# Patient Record
Sex: MALE | Race: WHITE | NOT HISPANIC OR LATINO
[De-identification: names, ages, dates, MRNs, and addresses within clinical notes are randomized per-mention and may not be internally consistent; named-entity substitution may affect disease eponyms.]

---

## 2017-04-25 ENCOUNTER — LABORATORY RESULT (OUTPATIENT)
Age: 47
End: 2017-04-25

## 2017-04-25 ENCOUNTER — APPOINTMENT (OUTPATIENT)
Dept: INTERNAL MEDICINE | Facility: CLINIC | Age: 47
End: 2017-04-25

## 2017-04-25 VITALS
HEIGHT: 65 IN | DIASTOLIC BLOOD PRESSURE: 86 MMHG | TEMPERATURE: 97.5 F | WEIGHT: 205 LBS | BODY MASS INDEX: 34.16 KG/M2 | SYSTOLIC BLOOD PRESSURE: 140 MMHG

## 2017-04-25 RX ORDER — ARIPIPRAZOLE 30 MG/1
30 TABLET ORAL
Refills: 0 | Status: ACTIVE | COMMUNITY

## 2017-04-27 ENCOUNTER — LABORATORY RESULT (OUTPATIENT)
Age: 47
End: 2017-04-27

## 2017-05-01 ENCOUNTER — OTHER (OUTPATIENT)
Age: 47
End: 2017-05-01

## 2017-05-01 LAB
ALBUMIN SERPL ELPH-MCNC: 4.5 G/DL
ALP BLD-CCNC: 41 U/L
ALT SERPL-CCNC: 33 U/L
ANION GAP SERPL CALC-SCNC: 14 MMOL/L
AST SERPL-CCNC: 26 U/L
BACTERIA STL CULT: NORMAL
BAKER'S YEAST AB QL: 37.2 UNITS
BAKER'S YEAST IGA QL IA: 54.1 UNITS
BAKER'S YEAST IGA QN IA: POSITIVE
BAKER'S YEAST IGG QN IA: POSITIVE
BASOPHILS # BLD AUTO: 0.01 K/UL
BASOPHILS NFR BLD AUTO: 0.2 %
BILIRUB SERPL-MCNC: 0.4 MG/DL
BUN SERPL-MCNC: 15 MG/DL
C DIFF TOX GENS STL QL NAA+PROBE: NORMAL
CALCIUM SERPL-MCNC: 9.7 MG/DL
CDIFF BY PCR: NOT DETECTED
CHLORIDE SERPL-SCNC: 101 MMOL/L
CO2 SERPL-SCNC: 28 MMOL/L
CREAT SERPL-MCNC: 1.08 MG/DL
CRYPTOSP AG SPEC QL: NORMAL
DEPRECATED O AND P PREP STL: NORMAL
ENDOMYSIUM IGA SER QL: NORMAL
ENDOMYSIUM IGA TITR SER: NORMAL
EOSINOPHIL # BLD AUTO: 0.1 K/UL
EOSINOPHIL NFR BLD AUTO: 2 %
G LAMBLIA AG STL QL: NORMAL
GLIADIN IGA SER QL: 6 UNITS
GLIADIN IGG SER QL: <5 UNITS
GLIADIN PEPTIDE IGA SER-ACNC: NEGATIVE
GLIADIN PEPTIDE IGG SER-ACNC: NEGATIVE
GLUCOSE SERPL-MCNC: 93 MG/DL
HCT VFR BLD CALC: 44.8 %
HGB BLD-MCNC: 15.1 G/DL
IGA SER QL IEP: 334 MG/DL
IMM GRANULOCYTES NFR BLD AUTO: 0.2 %
LYMPHOCYTES # BLD AUTO: 1.65 K/UL
LYMPHOCYTES NFR BLD AUTO: 32.3 %
MAN DIFF?: NORMAL
MCHC RBC-ENTMCNC: 27.4 PG
MCHC RBC-ENTMCNC: 33.7 GM/DL
MCV RBC AUTO: 81.3 FL
MONOCYTES # BLD AUTO: 0.46 K/UL
MONOCYTES NFR BLD AUTO: 9 %
MPO AB + PR3 PNL SER: NORMAL
NEUTROPHILS # BLD AUTO: 2.88 K/UL
NEUTROPHILS NFR BLD AUTO: 56.3 %
PLATELET # BLD AUTO: 208 K/UL
POTASSIUM SERPL-SCNC: 4.3 MMOL/L
PROT SERPL-MCNC: 8 G/DL
RBC # BLD: 5.51 M/UL
RBC # FLD: 13.8 %
SODIUM SERPL-SCNC: 143 MMOL/L
TTG IGA SER IA-ACNC: 6.5 UNITS
TTG IGA SER-ACNC: NEGATIVE
TTG IGG SER IA-ACNC: <5 UNITS
TTG IGG SER IA-ACNC: NEGATIVE
WBC # FLD AUTO: 5.11 K/UL

## 2017-05-03 LAB
ANNOTATION COMMENT IMP: NORMAL
HLA-DQ2: NEGATIVE
HLA-DQ8 QL: NEGATIVE
REF LAB TEST METHOD: NORMAL

## 2017-06-12 ENCOUNTER — LABORATORY RESULT (OUTPATIENT)
Age: 47
End: 2017-06-12

## 2017-06-12 ENCOUNTER — APPOINTMENT (OUTPATIENT)
Dept: INTERNAL MEDICINE | Facility: CLINIC | Age: 47
End: 2017-06-12

## 2017-07-18 ENCOUNTER — APPOINTMENT (OUTPATIENT)
Dept: INTERNAL MEDICINE | Facility: CLINIC | Age: 47
End: 2017-07-18

## 2017-07-18 VITALS
BODY MASS INDEX: 32.32 KG/M2 | SYSTOLIC BLOOD PRESSURE: 132 MMHG | TEMPERATURE: 96.3 F | DIASTOLIC BLOOD PRESSURE: 80 MMHG | HEIGHT: 65 IN | WEIGHT: 194 LBS

## 2017-07-18 DIAGNOSIS — R19.7 DIARRHEA, UNSPECIFIED: ICD-10-CM

## 2018-05-11 ENCOUNTER — TRANSCRIPTION ENCOUNTER (OUTPATIENT)
Age: 48
End: 2018-05-11

## 2018-05-18 ENCOUNTER — APPOINTMENT (OUTPATIENT)
Dept: PULMONOLOGY | Facility: CLINIC | Age: 48
End: 2018-05-18
Payer: COMMERCIAL

## 2018-05-18 VITALS
OXYGEN SATURATION: 94 % | DIASTOLIC BLOOD PRESSURE: 80 MMHG | WEIGHT: 198 LBS | HEIGHT: 65 IN | HEART RATE: 96 BPM | BODY MASS INDEX: 32.99 KG/M2 | SYSTOLIC BLOOD PRESSURE: 130 MMHG

## 2018-05-18 PROCEDURE — 94727 GAS DIL/WSHOT DETER LNG VOL: CPT

## 2018-05-18 PROCEDURE — 94060 EVALUATION OF WHEEZING: CPT

## 2018-05-18 PROCEDURE — 99203 OFFICE O/P NEW LOW 30 MIN: CPT | Mod: 25

## 2018-05-18 PROCEDURE — 94729 DIFFUSING CAPACITY: CPT

## 2018-06-13 ENCOUNTER — APPOINTMENT (OUTPATIENT)
Dept: PULMONOLOGY | Facility: CLINIC | Age: 48
End: 2018-06-13
Payer: COMMERCIAL

## 2018-06-13 VITALS
TEMPERATURE: 98.8 F | WEIGHT: 200 LBS | BODY MASS INDEX: 33.28 KG/M2 | SYSTOLIC BLOOD PRESSURE: 120 MMHG | DIASTOLIC BLOOD PRESSURE: 78 MMHG | OXYGEN SATURATION: 94 % | HEART RATE: 104 BPM

## 2018-06-13 PROCEDURE — 99214 OFFICE O/P EST MOD 30 MIN: CPT

## 2018-07-10 ENCOUNTER — APPOINTMENT (OUTPATIENT)
Dept: PULMONOLOGY | Facility: CLINIC | Age: 48
End: 2018-07-10

## 2018-08-07 ENCOUNTER — APPOINTMENT (OUTPATIENT)
Dept: INTERNAL MEDICINE | Facility: CLINIC | Age: 48
End: 2018-08-07
Payer: COMMERCIAL

## 2018-08-07 VITALS
TEMPERATURE: 97.04 F | DIASTOLIC BLOOD PRESSURE: 80 MMHG | BODY MASS INDEX: 33.99 KG/M2 | SYSTOLIC BLOOD PRESSURE: 140 MMHG | HEIGHT: 65 IN | WEIGHT: 204 LBS

## 2018-08-07 PROCEDURE — 99214 OFFICE O/P EST MOD 30 MIN: CPT

## 2018-08-07 RX ORDER — DOXYCYCLINE HYCLATE 100 MG/1
100 CAPSULE ORAL
Qty: 10 | Refills: 0 | Status: DISCONTINUED | COMMUNITY
Start: 2018-06-13 | End: 2018-08-07

## 2018-08-07 RX ORDER — PREDNISONE 10 MG/1
10 TABLET ORAL
Qty: 20 | Refills: 0 | Status: DISCONTINUED | COMMUNITY
Start: 2018-06-13 | End: 2018-08-07

## 2018-08-07 RX ORDER — GUAIFENESIN 600 MG/1
600 TABLET, EXTENDED RELEASE ORAL TWICE DAILY
Qty: 60 | Refills: 2 | Status: DISCONTINUED | COMMUNITY
Start: 2018-06-13 | End: 2018-08-07

## 2018-08-07 RX ORDER — DOCUSATE SODIUM 100 MG/1
100 CAPSULE ORAL TWICE DAILY
Qty: 100 | Refills: 1 | Status: ACTIVE | COMMUNITY
Start: 2018-08-07 | End: 1900-01-01

## 2018-08-07 RX ORDER — ALBUTEROL SULFATE 2.5 MG/3ML
(2.5 MG/3ML) SOLUTION RESPIRATORY (INHALATION) TWICE DAILY
Qty: 1 | Refills: 0 | Status: DISCONTINUED | COMMUNITY
Start: 2018-06-13 | End: 2018-08-07

## 2019-08-01 ENCOUNTER — APPOINTMENT (OUTPATIENT)
Dept: GASTROENTEROLOGY | Facility: CLINIC | Age: 49
End: 2019-08-01
Payer: MEDICAID

## 2019-08-01 VITALS
BODY MASS INDEX: 33.82 KG/M2 | SYSTOLIC BLOOD PRESSURE: 138 MMHG | WEIGHT: 203 LBS | DIASTOLIC BLOOD PRESSURE: 78 MMHG | HEIGHT: 65 IN | OXYGEN SATURATION: 96 %

## 2019-08-01 DIAGNOSIS — K43.9 VENTRAL HERNIA W/OUT OBSTRUCTION OR GANGRENE: ICD-10-CM

## 2019-08-01 PROCEDURE — 99213 OFFICE O/P EST LOW 20 MIN: CPT

## 2019-08-01 NOTE — HISTORY OF PRESENT ILLNESS
[FreeTextEntry1] : \par  Has a ventral hernia and umbilical hernia\par \par believes they are getting bigger; perhaps slightly uncomfortable\par no N/V\par no constipation/ straining

## 2019-08-01 NOTE — PHYSICAL EXAM
[General Appearance - In No Acute Distress] : in no acute distress [General Appearance - Alert] : alert [] : no respiratory distress [Auscultation Breath Sounds / Voice Sounds] : lungs were clear to auscultation bilaterally [Heart Rate And Rhythm] : heart rate was normal and rhythm regular [Heart Sounds] : normal S1 and S2 [Heart Sounds Gallop] : no gallops [Murmurs] : no murmurs [Heart Sounds Pericardial Friction Rub] : no pericardial rub [Bowel Sounds] : normal bowel sounds [Abdomen Soft] : soft [FreeTextEntry1] : (+) ventral and (+) umbilical hernia

## 2019-09-18 ENCOUNTER — APPOINTMENT (OUTPATIENT)
Dept: PULMONOLOGY | Facility: CLINIC | Age: 49
End: 2019-09-18
Payer: MEDICAID

## 2019-09-18 VITALS
DIASTOLIC BLOOD PRESSURE: 80 MMHG | RESPIRATION RATE: 16 BRPM | TEMPERATURE: 97.6 F | HEART RATE: 84 BPM | BODY MASS INDEX: 32.99 KG/M2 | HEIGHT: 65 IN | SYSTOLIC BLOOD PRESSURE: 120 MMHG | OXYGEN SATURATION: 98 % | WEIGHT: 198 LBS

## 2019-09-18 DIAGNOSIS — Z87.09 PERSONAL HISTORY OF OTHER DISEASES OF THE RESPIRATORY SYSTEM: ICD-10-CM

## 2019-09-18 PROCEDURE — 94727 GAS DIL/WSHOT DETER LNG VOL: CPT

## 2019-09-18 PROCEDURE — 94729 DIFFUSING CAPACITY: CPT

## 2019-09-18 PROCEDURE — 99214 OFFICE O/P EST MOD 30 MIN: CPT | Mod: 25

## 2019-09-18 PROCEDURE — 94060 EVALUATION OF WHEEZING: CPT

## 2019-09-18 RX ORDER — ALBUTEROL SULFATE 90 UG/1
108 (90 BASE) AEROSOL, METERED RESPIRATORY (INHALATION) TWICE DAILY
Qty: 1 | Refills: 2 | Status: ACTIVE | COMMUNITY
Start: 2018-05-18 | End: 1900-01-01

## 2019-09-19 NOTE — DISCUSSION/SUMMARY
[FreeTextEntry1] : PFT\par \par mild obstruction at level of small airways\par \par \par IMPRESSION\par \par URI\par obstructive airways disease\par \par PLAN\par \par restart Symbicort\par mucinex DM\par \par Faisal Rene MD FCCP \par \par

## 2019-09-19 NOTE — HISTORY OF PRESENT ILLNESS
[FreeTextEntry1] : 49 year old man who initially presented last year for for evaluation of severe cough associated with white phlegm,wheeze, and dyspnea.  PFT revealed obstructive pattern. he was prescribe Symbicort that he used and later stopped.   He now reports a hacking cough without sputum or wheeze.  he denies symptoms of reflux.\par \par PMH\par Pulmonary Sarcoidosis:  2007 that presented with fatigue dyspnea.  It was  diagnosed via mediastinoscopy at [facility].  He took prednisone for 4 months and has been in remission.\par \par PSH\par \par rotator cuff\par Achilles' tendon surgery\par diverticulitis  partial colectomy\par depression\par snoring\par sleep apnea test  years  negative\par \par \par MEDS:\par Abilify\par lamotrigine\par trazodone\par ibuprofen \par \par ALLERGY\par \par NKDA\par \par no environmental allergy\par \par SH\par smoked for 1 year, 19 years ago, then stopped\par ETOH  none\par work: gym \par no cannabis\par \par no pets\par \par lived in  [place] his entire life\par \par \par ROS\par no sinus problems\par \par he has heartburn, takes Zantac\par chronic  hip pain, received cortisone shots\par no pedal edema\par \par no headaches\par \par no skin rash\par \par \par

## 2019-09-19 NOTE — REVIEW OF SYSTEMS
[Postnasal Drip] : postnasal drip [Cough] : cough [Wheezing] : wheezing [Heartburn] : heartburn [Reflux] : reflux [Trauma] : ~T physical trauma [Arthralgias] : arthralgias [Depression] : depression [Fever] : no fever [Fatigue] : no fatigue [Poor Appetite] : normal appetite  [Nasal Congestion] : no nasal congestion [Sinus Problems] : no sinus problems [Sore Throat] : no sore throat [Dry Mouth] : no dry mouth [Dyspnea] : no dyspnea [Pleuritic Pain] : no pleuritic pain [Hypertension] : no ~T hypertension [Dysrhythmia] : no dysrhythmia [Edema] : ~T edema was not present [Hay Fever] : no hay fever [Nasal Discharge] : no nasal discharge [Vomiting] : no vomiting [FreeTextEntry2] : some sinus pressure

## 2019-09-19 NOTE — PHYSICAL EXAM
[General Appearance - Well Developed] : well developed [General Appearance - Well Nourished] : well nourished [General Appearance - In No Acute Distress] : no acute distress [Enlarged Base of the Tongue] : enlargement of the base of the tongue [Neck Cervical Mass (___cm)] : no neck mass was observed [Jugular Venous Distention Increased] : there was no jugular-venous distention [Heart Rate And Rhythm] : heart rate and rhythm were normal [Heart Sounds] : normal S1 and S2 [Murmurs] : no murmurs present [Arterial Pulses Normal] : the arterial pulses were normal [Respiration, Rhythm And Depth] : normal respiratory rhythm and effort [Kyphosis] : kyphosis [Bowel Sounds] : normal bowel sounds [Abdomen Soft] : soft [] : no hepato-splenomegaly [Abdomen Tenderness] : non-tender [Motor Exam] : the motor exam was normal [Affect] : the affect was normal [Normal Oropharynx] : abnormal oropharynx [Low Lying Soft Palate] : no low lying soft palate [Elongated Uvula] : no elongated uvula [Erythema] : no erythema of the pharynx [FreeTextEntry1] : mild coarse, no obvious wheeze

## 2019-10-16 ENCOUNTER — APPOINTMENT (OUTPATIENT)
Dept: PULMONOLOGY | Facility: CLINIC | Age: 49
End: 2019-10-16
Payer: MEDICAID

## 2019-10-16 VITALS
WEIGHT: 200 LBS | OXYGEN SATURATION: 94 % | RESPIRATION RATE: 16 BRPM | BODY MASS INDEX: 33.28 KG/M2 | HEART RATE: 111 BPM | DIASTOLIC BLOOD PRESSURE: 85 MMHG | TEMPERATURE: 98 F | SYSTOLIC BLOOD PRESSURE: 122 MMHG

## 2019-10-16 PROCEDURE — 90686 IIV4 VACC NO PRSV 0.5 ML IM: CPT

## 2019-10-16 PROCEDURE — 94727 GAS DIL/WSHOT DETER LNG VOL: CPT

## 2019-10-16 PROCEDURE — G0008: CPT

## 2019-10-16 PROCEDURE — 94729 DIFFUSING CAPACITY: CPT

## 2019-10-16 PROCEDURE — 99214 OFFICE O/P EST MOD 30 MIN: CPT | Mod: 25

## 2019-10-16 PROCEDURE — 94060 EVALUATION OF WHEEZING: CPT

## 2019-10-17 NOTE — PHYSICAL EXAM
[General Appearance - Well Developed] : well developed [General Appearance - Well Nourished] : well nourished [General Appearance - In No Acute Distress] : no acute distress [Enlarged Base of the Tongue] : enlargement of the base of the tongue [Neck Cervical Mass (___cm)] : no neck mass was observed [Jugular Venous Distention Increased] : there was no jugular-venous distention [Heart Rate And Rhythm] : heart rate and rhythm were normal [Heart Sounds] : normal S1 and S2 [Murmurs] : no murmurs present [Arterial Pulses Normal] : the arterial pulses were normal [Respiration, Rhythm And Depth] : normal respiratory rhythm and effort [Auscultation Breath Sounds / Voice Sounds] : lungs were clear to auscultation bilaterally [Kyphosis] : kyphosis [Bowel Sounds] : normal bowel sounds [Abdomen Soft] : soft [Abdomen Tenderness] : non-tender [] : no hepato-splenomegaly [Motor Exam] : the motor exam was normal [Affect] : the affect was normal [Normal Oropharynx] : abnormal oropharynx [Low Lying Soft Palate] : no low lying soft palate [Elongated Uvula] : no elongated uvula [Erythema] : no erythema of the pharynx [FreeTextEntry1] : thick neck

## 2019-10-17 NOTE — HISTORY OF PRESENT ILLNESS
[FreeTextEntry1] : 49 year old man who initially presented last year for for evaluation of severe cough associated with white phlegm,wheeze, and dyspnea.  PFT revealed obstructive pattern.\par \par He has been using Symbicort regularly and he is much improved.\par \par He is taking Pepcid AC OTC. He denies symptoms of reflux.\par \par PMH\par Pulmonary Sarcoidosis:  2007 that presented with fatigue dyspnea.  It was  diagnosed via mediastinoscopy at [facility].  He took prednisone for 4 months and has been in remission.\par \par PSH\par \par rotator cuff\par Achilles' tendon surgery\par diverticulitis  partial colectomy\par depression\par snoring\par sleep apnea test  years  negative\par \par \par MEDS:\par Abilify\par lamotrigine\par trazodone\par ibuprofen \par \par ALLERGY\par \par NKDA\par \par no environmental allergy\par \par SH\par smoked for 1 year, 19 years ago, then stopped\par ETOH  none\par work: gym \par no cannabis\par \par no pets\par \par lived in  [place] his entire life\par \par \par ROS\par no sinus problems\par \par he has heartburn, takes Zantac\par chronic  hip pain, received cortisone shots\par no pedal edema\par \par no headaches\par \par no skin rash\par \par \par

## 2019-10-17 NOTE — DISCUSSION/SUMMARY
[FreeTextEntry1] : reactive airways disease, improved\par \par He may stop Symbicort and only use albuterol MDI as needed\par \par he is administered influenza vaccine today\par \par Faisal Rene MD FCCP \par \par

## 2019-10-18 ENCOUNTER — MED ADMIN CHARGE (OUTPATIENT)
Age: 49
End: 2019-10-18

## 2020-01-22 ENCOUNTER — APPOINTMENT (OUTPATIENT)
Dept: PULMONOLOGY | Facility: CLINIC | Age: 50
End: 2020-01-22

## 2020-02-26 ENCOUNTER — APPOINTMENT (OUTPATIENT)
Dept: GASTROENTEROLOGY | Facility: CLINIC | Age: 50
End: 2020-02-26
Payer: MEDICAID

## 2020-02-26 VITALS
SYSTOLIC BLOOD PRESSURE: 130 MMHG | HEART RATE: 93 BPM | BODY MASS INDEX: 34.82 KG/M2 | HEIGHT: 65 IN | DIASTOLIC BLOOD PRESSURE: 80 MMHG | OXYGEN SATURATION: 97 % | WEIGHT: 209 LBS | TEMPERATURE: 97.8 F

## 2020-02-26 PROCEDURE — 99214 OFFICE O/P EST MOD 30 MIN: CPT | Mod: 25

## 2020-02-26 PROCEDURE — 36415 COLL VENOUS BLD VENIPUNCTURE: CPT

## 2020-02-26 RX ORDER — BUPROPION HYDROCHLORIDE 150 MG/1
150 TABLET, EXTENDED RELEASE ORAL
Refills: 0 | Status: ACTIVE | COMMUNITY

## 2020-02-26 RX ORDER — STANDARDIZED SENNA CONCENTRATE AND DOCUSATE SODIUM 8.6; 5 MG/1; MG/1
8.6-5 TABLET ORAL
Qty: 1 | Refills: 5 | Status: ACTIVE | COMMUNITY
Start: 2020-02-26 | End: 1900-01-01

## 2020-02-26 RX ORDER — LAMOTRIGINE 50 MG/1
50 TABLET, EXTENDED RELEASE ORAL
Refills: 0 | Status: DISCONTINUED | COMMUNITY
End: 2020-02-26

## 2020-02-26 RX ORDER — POLYETHYLENE GLYCOL 3350 17 G/17G
17 POWDER, FOR SOLUTION ORAL DAILY
Qty: 1 | Refills: 5 | Status: ACTIVE | COMMUNITY
Start: 2020-02-26 | End: 1900-01-01

## 2020-02-26 RX ORDER — RANITIDINE HCL 15 MG/ML
75 SYRUP ORAL
Refills: 0 | Status: DISCONTINUED | COMMUNITY
End: 2020-02-26

## 2020-02-26 RX ORDER — TRAZODONE HYDROCHLORIDE 50 MG/1
50 TABLET ORAL
Refills: 0 | Status: DISCONTINUED | COMMUNITY
End: 2020-02-26

## 2020-02-26 NOTE — PHYSICAL EXAM
[General Appearance - In No Acute Distress] : in no acute distress [General Appearance - Alert] : alert [Heart Rate And Rhythm] : heart rate was normal and rhythm regular [Auscultation Breath Sounds / Voice Sounds] : lungs were clear to auscultation bilaterally [Heart Sounds] : normal S1 and S2 [Heart Sounds Pericardial Friction Rub] : no pericardial rub [Heart Sounds Gallop] : no gallops [Murmurs] : no murmurs [Abdomen Tenderness] : non-tender [Abdomen Soft] : soft [Bowel Sounds] : normal bowel sounds [] : no hepato-splenomegaly [Abdomen Mass (___ Cm)] : no abdominal mass palpated

## 2020-02-26 NOTE — HISTORY OF PRESENT ILLNESS
[FreeTextEntry1] : \par constipated for the last 2 months\par can't push it out - painful when he does\par has BM 1-2x/day\par \par no abdominal pain\par (+) BRB on tp\par \par good appetite; weight increased\par \par Tried Metamucil on and off - didn't seem to help\par

## 2020-02-27 LAB
ALBUMIN SERPL ELPH-MCNC: 4.7 G/DL
ALP BLD-CCNC: 64 U/L
ALT SERPL-CCNC: 24 U/L
ANION GAP SERPL CALC-SCNC: 12 MMOL/L
AST SERPL-CCNC: 22 U/L
BASOPHILS # BLD AUTO: 0.01 K/UL
BASOPHILS NFR BLD AUTO: 0.2 %
BILIRUB SERPL-MCNC: 0.3 MG/DL
BUN SERPL-MCNC: 23 MG/DL
CALCIUM SERPL-MCNC: 9.3 MG/DL
CHLORIDE SERPL-SCNC: 103 MMOL/L
CO2 SERPL-SCNC: 30 MMOL/L
CREAT SERPL-MCNC: 1.14 MG/DL
EOSINOPHIL # BLD AUTO: 0.16 K/UL
EOSINOPHIL NFR BLD AUTO: 2.6 %
GLUCOSE SERPL-MCNC: 115 MG/DL
HCT VFR BLD CALC: 49.7 %
HGB BLD-MCNC: 16.4 G/DL
IMM GRANULOCYTES NFR BLD AUTO: 0.3 %
LYMPHOCYTES # BLD AUTO: 2.39 K/UL
LYMPHOCYTES NFR BLD AUTO: 38.7 %
MAN DIFF?: NORMAL
MCHC RBC-ENTMCNC: 26.6 PG
MCHC RBC-ENTMCNC: 33 GM/DL
MCV RBC AUTO: 80.6 FL
MONOCYTES # BLD AUTO: 0.58 K/UL
MONOCYTES NFR BLD AUTO: 9.4 %
NEUTROPHILS # BLD AUTO: 3.02 K/UL
NEUTROPHILS NFR BLD AUTO: 48.8 %
PLATELET # BLD AUTO: 217 K/UL
POTASSIUM SERPL-SCNC: 4.1 MMOL/L
PROT SERPL-MCNC: 7.8 G/DL
RBC # BLD: 6.17 M/UL
RBC # FLD: 13.3 %
SODIUM SERPL-SCNC: 144 MMOL/L
T4 FREE SERPL-MCNC: 1 NG/DL
TSH SERPL-ACNC: 2.49 UIU/ML
WBC # FLD AUTO: 6.18 K/UL

## 2020-05-06 ENCOUNTER — APPOINTMENT (OUTPATIENT)
Dept: PULMONOLOGY | Facility: CLINIC | Age: 50
End: 2020-05-06
Payer: MEDICAID

## 2020-05-06 VITALS
RESPIRATION RATE: 16 BRPM | TEMPERATURE: 98.4 F | BODY MASS INDEX: 33.11 KG/M2 | HEART RATE: 106 BPM | WEIGHT: 206 LBS | DIASTOLIC BLOOD PRESSURE: 70 MMHG | SYSTOLIC BLOOD PRESSURE: 130 MMHG | OXYGEN SATURATION: 97 % | HEIGHT: 66 IN

## 2020-05-06 PROCEDURE — 99214 OFFICE O/P EST MOD 30 MIN: CPT

## 2020-05-06 NOTE — PHYSICAL EXAM
[No Acute Distress] : no acute distress [Normal Appearance] : normal appearance [No Neck Mass] : no neck mass [Normal Rate/Rhythm] : normal rate/rhythm [Normal S1, S2] : normal s1, s2 [No Murmurs] : no murmurs [No Resp Distress] : no resp distress [Clear to Auscultation Bilaterally] : clear to auscultation bilaterally [Benign] : benign [No HSM] : no hsm [Normal Bowel Sounds] : normal bowel sounds [No Clubbing] : no clubbing [No Edema] : no edema [Normal Color/ Pigmentation] : normal color/ pigmentation [No Focal Deficits] : no focal deficits [Oriented x3] : oriented x3 [Normal Affect] : normal affect [TextBox_2] : elevated BMI [TextBox_44] : thick [TextBox_68] : diminished aeration

## 2020-05-06 NOTE — DISCUSSION/SUMMARY
[FreeTextEntry1] : asthma with increased cough: will restart Symbicort 160 2 puffs twice per day, with albuterol as needed\par \par oxygen desaturation at night, with risk factors for sleep apnea:  will order overnight sleep study to evaluate for JUAN\par \par Sarcoidosis: stable at this time\par \par possible exposure to COVID, with negative nasal swab PCR and stable CXR:  will recommend antibody testing once available.  He was treated with azithromycin by urgent care. No intervention at this time\par \par Faisal Rene MD [facility]

## 2020-05-06 NOTE — HISTORY OF PRESENT ILLNESS
[TextBox_4] : 50 year old man who initially presented for for evaluation of severe cough associated with white phlegm,wheeze, and dyspnea. PFT revealed obstructive pattern.\par \par He used Symbicort with much improved status. Symbicort was stopped  at time of last visit. \par \par He also has pulmonary Sarcoidosis diagnosed in  that presented with fatigue dyspnea. It was diagnosed via mediastinoscopy at [facility]. He took prednisone for 4 months and has been in remission.\par \par He presents today due to cough.  He informs me that his father and sister in law have  of COVID 19.  He has had cough for 6 weeks.  He had evaluation at urgent care 2 weeks ago.  CXR at urgent care reported as having "possible" faint infiltrates.  I reviewed CXR on CD.  Appearance is stable compared to CXR 2018.  He had PCR nasal swab at urgent care  that was negative. He was treated with azithromycin.\par \par He denies fever, chills dyspnea.  His wife has also had testing with nasal swab that was negative for COVID.\par \par He reports that he has noted oxygen desaturation using home oximetry.  He previously reported on questioning that he had sleep apnea testing that was negative.

## 2020-05-06 NOTE — REVIEW OF SYSTEMS
[Cough] : cough [Wheezing] : wheezing [Fever] : no fever [Nasal Congestion] : no nasal congestion [Sputum] : no sputum [Dyspnea] : no dyspnea [Hay Fever] : no hay fever [GERD] : no gerd

## 2020-07-01 ENCOUNTER — APPOINTMENT (OUTPATIENT)
Dept: PULMONOLOGY | Facility: CLINIC | Age: 50
End: 2020-07-01
Payer: MEDICAID

## 2020-07-01 VITALS
OXYGEN SATURATION: 96 % | TEMPERATURE: 98 F | RESPIRATION RATE: 16 BRPM | BODY MASS INDEX: 33.25 KG/M2 | DIASTOLIC BLOOD PRESSURE: 80 MMHG | WEIGHT: 206 LBS | HEART RATE: 87 BPM | SYSTOLIC BLOOD PRESSURE: 118 MMHG

## 2020-07-01 PROCEDURE — 99214 OFFICE O/P EST MOD 30 MIN: CPT

## 2020-07-04 NOTE — HISTORY OF PRESENT ILLNESS
[TextBox_4] : 50 year old man who initially presented for for evaluation of severe cough associated with white phlegm,wheeze, and dyspnea. PFT revealed obstructive pattern.  he was diagnosed with asthma\par \par He also has pulmonary Sarcoidosis diagnosed in 2007 that presented with fatigue dyspnea. It was diagnosed via mediastinoscopy at [facility]. He took prednisone for 4 months and has been in remission.\par \par He presented last visit due to increased cough and was restarted on Symbicort. \par \par he also underwent COVID antibody testing that was negative.  \par He is better but has increased dyspnea on humid days.  He is using Symbicort twice per day with albuterol which he uses twice per week.\par \par He had also reported that he had noted oxygen desaturation using home oximetry. He previously reported on questioning that he had sleep apnea testing that was negative. \par \par He has not been able to have sleep study.\par \par

## 2020-07-04 NOTE — REVIEW OF SYSTEMS
[Cough] : cough [Wheezing] : wheezing [Fever] : no fever [Nasal Congestion] : no nasal congestion [Sputum] : no sputum [Hay Fever] : no hay fever [GERD] : no gerd [Dyspnea] : no dyspnea

## 2020-07-04 NOTE — PHYSICAL EXAM
[No Acute Distress] : no acute distress [No Neck Mass] : no neck mass [Normal Appearance] : normal appearance [Normal S1, S2] : normal s1, s2 [Normal Rate/Rhythm] : normal rate/rhythm [No Resp Distress] : no resp distress [No Murmurs] : no murmurs [No HSM] : no hsm [Benign] : benign [Clear to Auscultation Bilaterally] : clear to auscultation bilaterally [Normal Bowel Sounds] : normal bowel sounds [No Clubbing] : no clubbing [Normal Color/ Pigmentation] : normal color/ pigmentation [No Focal Deficits] : no focal deficits [No Edema] : no edema [Oriented x3] : oriented x3 [Normal Affect] : normal affect [TextBox_2] : elevated BMI [TextBox_68] : diminished aeration, mild wheeze present [TextBox_44] : thick

## 2020-07-04 NOTE — DISCUSSION/SUMMARY
[FreeTextEntry1] : asthma with residual wheeze: \par Switch from Symbicort to Dulera, add montelukast 10 mg per day\par \par oxygen desaturation at night, with risk factors for sleep apnea: Await overnight sleep study to evaluate for JUAN, when available\par \par Sarcoidosis: stable at this time\par \par antibodies to COVID negative \par \par \par Faisal Rene MD FCC[facility] \par \par

## 2020-10-07 ENCOUNTER — APPOINTMENT (OUTPATIENT)
Dept: PULMONOLOGY | Facility: CLINIC | Age: 50
End: 2020-10-07
Payer: MEDICAID

## 2020-10-07 VITALS
WEIGHT: 207 LBS | OXYGEN SATURATION: 96 % | DIASTOLIC BLOOD PRESSURE: 80 MMHG | HEART RATE: 96 BPM | TEMPERATURE: 98.6 F | SYSTOLIC BLOOD PRESSURE: 122 MMHG | RESPIRATION RATE: 17 BRPM | BODY MASS INDEX: 33.41 KG/M2

## 2020-10-07 DIAGNOSIS — Z23 ENCOUNTER FOR IMMUNIZATION: ICD-10-CM

## 2020-10-07 PROCEDURE — 99214 OFFICE O/P EST MOD 30 MIN: CPT

## 2020-10-08 NOTE — PHYSICAL EXAM
[No Acute Distress] : no acute distress [Normal Appearance] : normal appearance [No Neck Mass] : no neck mass [Normal Rate/Rhythm] : normal rate/rhythm [Normal S1, S2] : normal s1, s2 [No Murmurs] : no murmurs [No Resp Distress] : no resp distress [Clear to Auscultation Bilaterally] : clear to auscultation bilaterally [Benign] : benign [No HSM] : no hsm [Normal Bowel Sounds] : normal bowel sounds [No Clubbing] : no clubbing [No Edema] : no edema [Normal Color/ Pigmentation] : normal color/ pigmentation [No Focal Deficits] : no focal deficits [Oriented x3] : oriented x3 [Normal Affect] : normal affect [TextBox_2] : elevated BMI [TextBox_44] : thick [TextBox_68] : diminished aeration, mild wheeze present

## 2020-10-08 NOTE — DISCUSSION/SUMMARY
[FreeTextEntry1] : Asthma:   continue ICS LABA\par \par probable JUAN:   awaits sleep study\par \par Sarcoidosis, in remission\par \par \par Health maintenance;  PPSV given\par \par he will return for influenza vaccine\par \par Faisal Rene MD FCCP \par \par

## 2020-10-08 NOTE — HISTORY OF PRESENT ILLNESS
[TextBox_4] : 50 year old man who initially presented for for evaluation of severe cough associated with white phlegm,wheeze, and dyspnea. PFT revealed obstructive pattern.  He was diagnosed with asthma\par \par He also has pulmonary Sarcoidosis diagnosed in 2007 that presented with fatigue dyspnea. It was diagnosed via mediastinoscopy at [facility]. He took prednisone for 4 months and has been in remission.\par \par He also underwent COVID antibody testing that was negative.  \par He is better but has increased dyspnea on humid days.  \par \par He had also reported that he had noted oxygen desaturation using home oximetry. He previously reported on questioning that he had sleep apnea testing that was negative. \par \par He has not been able to have sleep study.\par \par He is using Symbicort twice per day.  (He did not switch to Dulera, insurance reasons).  He notices some dyspnea when he lies down.  He breathes well in the day.  Sleep study has recently been approved.  He will set up appointment. \par \par He has GERD and takes Nexium every night

## 2020-10-08 NOTE — REVIEW OF SYSTEMS
[Fever] : no fever [Nasal Congestion] : no nasal congestion [Cough] : cough [Sputum] : no sputum [Dyspnea] : no dyspnea [Wheezing] : wheezing [Hay Fever] : no hay fever [GERD] : no gerd

## 2020-10-12 ENCOUNTER — MED ADMIN CHARGE (OUTPATIENT)
Age: 50
End: 2020-10-12

## 2020-11-20 ENCOUNTER — RX RENEWAL (OUTPATIENT)
Age: 50
End: 2020-11-20

## 2020-12-21 PROBLEM — Z87.09 HISTORY OF UPPER RESPIRATORY INFECTION: Status: RESOLVED | Noted: 2019-09-18 | Resolved: 2020-12-21

## 2021-02-10 ENCOUNTER — APPOINTMENT (OUTPATIENT)
Dept: PULMONOLOGY | Facility: CLINIC | Age: 51
End: 2021-02-10
Payer: MEDICAID

## 2021-02-10 VITALS
OXYGEN SATURATION: 97 % | WEIGHT: 212 LBS | RESPIRATION RATE: 17 BRPM | BODY MASS INDEX: 34.07 KG/M2 | DIASTOLIC BLOOD PRESSURE: 81 MMHG | TEMPERATURE: 98 F | HEIGHT: 66 IN | HEART RATE: 94 BPM | SYSTOLIC BLOOD PRESSURE: 150 MMHG

## 2021-02-10 DIAGNOSIS — Z00.00 ENCOUNTER FOR GENERAL ADULT MEDICAL EXAMINATION W/OUT ABNORMAL FINDINGS: ICD-10-CM

## 2021-02-10 DIAGNOSIS — J44.9 CHRONIC OBSTRUCTIVE PULMONARY DISEASE, UNSPECIFIED: ICD-10-CM

## 2021-02-10 PROCEDURE — 99072 ADDL SUPL MATRL&STAF TM PHE: CPT

## 2021-02-10 PROCEDURE — 99213 OFFICE O/P EST LOW 20 MIN: CPT

## 2021-02-11 PROBLEM — J44.9 OBSTRUCTIVE AIRWAY DISEASE: Status: ACTIVE | Noted: 2018-05-18

## 2021-02-11 NOTE — DISCUSSION/SUMMARY
[FreeTextEntry1] : Asthma:   continue ICS LABA\par \par probable JUAN:   awaits sleep study, he wishes to postpone, importance emphasized\par \par Sarcoidosis, in remission\par \par \par Health maintenance;  PPSV given last visit. He has had the influenza vaccine this season. \par \par \par Tested for COVID Ab\par \par Faisal Rene MD \par \par \par Faisal Rene MD FCCP \par \par

## 2021-02-11 NOTE — HISTORY OF PRESENT ILLNESS
[Snoring] : snoring [Hypersomnolence] : hypersomnolence [TextBox_4] : 51 year old man who initially presented for for evaluation of severe cough associated with white phlegm,wheeze, and dyspnea. PFT revealed obstructive pattern.  He was diagnosed with asthma\par \par He also has pulmonary Sarcoidosis diagnosed in 2007 that presented with fatigue dyspnea. It was diagnosed via mediastinoscopy at [facility]. He took prednisone for 4 months and has been in remission.\par \par He also underwent COVID antibody testing that was negative.  \par He is better but has increased dyspnea on humid days.  \par \par He had also reported that he had noted oxygen desaturation using home oximetry. He previously reported on questioning that he had sleep apnea testing that was negative. \par \par \par He is using Symbicort twice per day.  (He did not switch to Dulera, insurance reasons).  He breathes well in now, denies dyspnea.  Sleep study has recently been approved.  He has postponed it.  He wishes to continue to wait.\par \par He has GERD and takes Nexium every night\par \par \par His wife is pregnant.  She was found to be positive for COVID antibody.\par \par \par he feels well.  he would like to bet tested for antibodies to COVID.  He is tolerating inhaled meds. he has had no exacerbations.\par \par \par He had flu vaccine in Fall 2020.\par \par

## 2021-02-12 ENCOUNTER — TRANSCRIPTION ENCOUNTER (OUTPATIENT)
Age: 51
End: 2021-02-12

## 2021-02-15 ENCOUNTER — NON-APPOINTMENT (OUTPATIENT)
Age: 51
End: 2021-02-15

## 2021-02-21 LAB
ALBUMIN SERPL ELPH-MCNC: 4.7 G/DL
ALP BLD-CCNC: 66 U/L
ALT SERPL-CCNC: 30 U/L
ANION GAP SERPL CALC-SCNC: 15 MMOL/L
AST SERPL-CCNC: 18 U/L
BILIRUB SERPL-MCNC: 0.3 MG/DL
BUN SERPL-MCNC: 21 MG/DL
CALCIUM SERPL-MCNC: 9.8 MG/DL
CHLORIDE SERPL-SCNC: 102 MMOL/L
CO2 SERPL-SCNC: 25 MMOL/L
CREAT SERPL-MCNC: 1.23 MG/DL
GLUCOSE SERPL-MCNC: 92 MG/DL
POTASSIUM SERPL-SCNC: 4.2 MMOL/L
PROT SERPL-MCNC: 7.7 G/DL
SARS-COV-2 IGG SERPL IA-ACNC: 0.07 INDEX
SARS-COV-2 IGG SERPL QL IA: NEGATIVE
SODIUM SERPL-SCNC: 142 MMOL/L

## 2021-09-08 ENCOUNTER — APPOINTMENT (OUTPATIENT)
Dept: PULMONOLOGY | Facility: CLINIC | Age: 51
End: 2021-09-08

## 2021-09-22 ENCOUNTER — APPOINTMENT (OUTPATIENT)
Dept: PULMONOLOGY | Facility: CLINIC | Age: 51
End: 2021-09-22
Payer: MEDICAID

## 2021-09-22 VITALS
OXYGEN SATURATION: 96 % | HEART RATE: 108 BPM | WEIGHT: 204 LBS | SYSTOLIC BLOOD PRESSURE: 128 MMHG | TEMPERATURE: 98.4 F | DIASTOLIC BLOOD PRESSURE: 90 MMHG | BODY MASS INDEX: 32.93 KG/M2

## 2021-09-22 DIAGNOSIS — K21.9 GASTRO-ESOPHAGEAL REFLUX DISEASE W/OUT ESOPHAGITIS: ICD-10-CM

## 2021-09-22 PROCEDURE — 99213 OFFICE O/P EST LOW 20 MIN: CPT

## 2021-09-22 RX ORDER — ESOMEPRAZOLE MAGNESIUM 20 MG/1
20 CAPSULE, DELAYED RELEASE ORAL DAILY
Qty: 30 | Refills: 0 | Status: ACTIVE | COMMUNITY
Start: 2021-09-22 | End: 1900-01-01

## 2021-09-23 NOTE — REVIEW OF SYSTEMS
[Cough] : cough [Wheezing] : wheezing [Nasal Congestion] : no nasal congestion [Fever] : no fever [Sputum] : no sputum [Dyspnea] : no dyspnea [Hay Fever] : no hay fever [GERD] : no gerd

## 2021-09-23 NOTE — PHYSICAL EXAM
[No Acute Distress] : no acute distress [Enlarged Base of the Tongue] : enlarged base of the tongue [IV] : Mallampati Class: IV [Normal Appearance] : normal appearance [No Neck Mass] : no neck mass [Normal Rate/Rhythm] : normal rate/rhythm [Normal S1, S2] : normal s1, s2 [No Murmurs] : no murmurs [No Resp Distress] : no resp distress [Clear to Auscultation Bilaterally] : clear to auscultation bilaterally [Benign] : benign [No HSM] : no hsm [Normal Bowel Sounds] : normal bowel sounds [No Clubbing] : no clubbing [No Edema] : no edema [Normal Color/ Pigmentation] : normal color/ pigmentation [No Focal Deficits] : no focal deficits [Oriented x3] : oriented x3 [Normal Affect] : normal affect [TextBox_2] : elevated BMI [TextBox_44] : thick [TextBox_68] : diminished aeration, expiratory wheeze present

## 2021-09-23 NOTE — HISTORY OF PRESENT ILLNESS
[Snoring] : snoring [Hypersomnolence] : hypersomnolence [TextBox_4] : 51 year old man who initially presented for for evaluation of severe cough associated with white phlegm,wheeze, and dyspnea. PFT revealed obstructive pattern.  He was diagnosed with asthma\par \par He also has pulmonary Sarcoidosis diagnosed in 2007 that presented with fatigue dyspnea. It was diagnosed via mediastinoscopy at [facility]. He took prednisone for 4 months and has been in remission.\par \par He also underwent COVID antibody testing that was negative.  \par \par \par \par He has GERD, taking Zantac rather than PPI.  He is still having symptoms at night.\par \par He has stopped using ICS/LABA and inhaled bronchodilator \par \par He has had cough for 5 weeks. \par \par He is breathing OK.  he denies wheeze.\par \par His wife gave birth to a daughter, doing well. \par \par \par \par He had flu vaccine vaccine in Fall 2020.\par \par

## 2021-09-23 NOTE — DISCUSSION/SUMMARY
[FreeTextEntry1] : Asthma:   with cough, will restart ICS LABA\par \par probable JUAN:   awaits sleep study, he wishes to proceed\par \par Sarcoidosis, in remission\par \par Start on Nexium 20 mg per day\par \par He has appointment with GI in 1 month\par \par Health maintenance;  PPSV given last visit. \par \par \par He has had the influenza vaccine this season. \par \par Faisal Rene MD \par \par

## 2021-11-18 ENCOUNTER — APPOINTMENT (OUTPATIENT)
Dept: GASTROENTEROLOGY | Facility: CLINIC | Age: 51
End: 2021-11-18
Payer: MEDICAID

## 2021-11-18 VITALS
WEIGHT: 206 LBS | SYSTOLIC BLOOD PRESSURE: 125 MMHG | TEMPERATURE: 97.4 F | RESPIRATION RATE: 18 BRPM | DIASTOLIC BLOOD PRESSURE: 82 MMHG | HEART RATE: 114 BPM | OXYGEN SATURATION: 96 % | BODY MASS INDEX: 34.32 KG/M2 | HEIGHT: 65 IN

## 2021-11-18 DIAGNOSIS — K59.00 CONSTIPATION, UNSPECIFIED: ICD-10-CM

## 2021-11-18 DIAGNOSIS — K64.4 RESIDUAL HEMORRHOIDAL SKIN TAGS: ICD-10-CM

## 2021-11-18 PROCEDURE — G0008: CPT

## 2021-11-18 PROCEDURE — 90686 IIV4 VACC NO PRSV 0.5 ML IM: CPT

## 2021-11-18 PROCEDURE — 99214 OFFICE O/P EST MOD 30 MIN: CPT | Mod: 25

## 2021-11-18 RX ORDER — HYDROCORTISONE 2.5% 25 MG/G
2.5 CREAM TOPICAL
Qty: 1 | Refills: 1 | Status: ACTIVE | COMMUNITY
Start: 2021-11-18 | End: 1900-01-01

## 2021-11-18 RX ORDER — POLYETHYLENE GLYCOL 3350 17 G/17G
17 POWDER, FOR SOLUTION ORAL DAILY
Qty: 1 | Refills: 5 | Status: ACTIVE | COMMUNITY
Start: 2021-11-18 | End: 1900-01-01

## 2021-11-18 NOTE — ASSESSMENT
[FreeTextEntry1] : \par constipation - water, fiber\par Miralax daily\par \par hemorrhoid - Proctozone cream\par Baby wipes\par Sitz baths\par \par \par ** Flu vaccine today

## 2021-11-18 NOTE — HISTORY OF PRESENT ILLNESS
[FreeTextEntry1] : \par Had a baby girl, Chhaya\par \par Lost his father and sister-in-law in covid\par \par c/o anal pruritus\par c/o constipation\par c/o gassy\par \par Has BM daily but strains\par Sees BRB on tp, not on stool\par No rectal pain\par No abd pain

## 2021-11-18 NOTE — PHYSICAL EXAM
[General Appearance - Alert] : alert [General Appearance - In No Acute Distress] : in no acute distress [Neck Appearance] : the appearance of the neck was normal [Neck Cervical Mass (___cm)] : no neck mass was observed [Jugular Venous Distention Increased] : there was no jugular-venous distention [Thyroid Diffuse Enlargement] : the thyroid was not enlarged [Thyroid Nodule] : there were no palpable thyroid nodules [Auscultation Breath Sounds / Voice Sounds] : lungs were clear to auscultation bilaterally [Heart Rate And Rhythm] : heart rate was normal and rhythm regular [Heart Sounds] : normal S1 and S2 [Heart Sounds Gallop] : no gallops [Murmurs] : no murmurs [Heart Sounds Pericardial Friction Rub] : no pericardial rub [Bowel Sounds] : normal bowel sounds [Abdomen Soft] : soft [Abdomen Tenderness] : non-tender [] : no hepato-splenomegaly [Abdomen Mass (___ Cm)] : no abdominal mass palpated [FreeTextEntry1] : (+) large external hemorrhoids

## 2021-11-22 ENCOUNTER — MED ADMIN CHARGE (OUTPATIENT)
Age: 51
End: 2021-11-22

## 2022-01-19 ENCOUNTER — APPOINTMENT (OUTPATIENT)
Dept: PULMONOLOGY | Facility: CLINIC | Age: 52
End: 2022-01-19

## 2022-01-22 ENCOUNTER — RX RENEWAL (OUTPATIENT)
Age: 52
End: 2022-01-22

## 2022-01-22 RX ORDER — HYDROCORTISONE 25 MG/G
2.5 CREAM TOPICAL
Qty: 28 | Refills: 1 | Status: ACTIVE | COMMUNITY
Start: 2022-01-22 | End: 1900-01-01

## 2022-06-28 LAB — SARS-COV-2 N GENE NPH QL NAA+PROBE: NOT DETECTED

## 2022-06-29 ENCOUNTER — RESULT CHARGE (OUTPATIENT)
Age: 52
End: 2022-06-29

## 2022-06-30 ENCOUNTER — NON-APPOINTMENT (OUTPATIENT)
Age: 52
End: 2022-06-30

## 2022-06-30 ENCOUNTER — APPOINTMENT (OUTPATIENT)
Dept: GASTROENTEROLOGY | Facility: CLINIC | Age: 52
End: 2022-06-30

## 2022-06-30 DIAGNOSIS — K46.9 UNSPECIFIED ABDOMINAL HERNIA W/OUT OBSTRUCTION OR GANGRENE: ICD-10-CM

## 2022-06-30 PROCEDURE — 45378 DIAGNOSTIC COLONOSCOPY: CPT

## 2023-08-18 ENCOUNTER — NON-APPOINTMENT (OUTPATIENT)
Age: 53
End: 2023-08-18

## 2023-10-18 ENCOUNTER — APPOINTMENT (OUTPATIENT)
Dept: PULMONOLOGY | Facility: CLINIC | Age: 53
End: 2023-10-18
Payer: MEDICAID

## 2023-10-18 VITALS
BODY MASS INDEX: 33.95 KG/M2 | DIASTOLIC BLOOD PRESSURE: 80 MMHG | HEART RATE: 109 BPM | TEMPERATURE: 97.7 F | WEIGHT: 204 LBS | SYSTOLIC BLOOD PRESSURE: 114 MMHG | OXYGEN SATURATION: 95 %

## 2023-10-18 DIAGNOSIS — G47.9 SLEEP DISORDER, UNSPECIFIED: ICD-10-CM

## 2023-10-18 PROCEDURE — 99215 OFFICE O/P EST HI 40 MIN: CPT

## 2023-10-18 RX ORDER — BUDESONIDE AND FORMOTEROL FUMARATE DIHYDRATE 160; 4.5 UG/1; UG/1
160-4.5 AEROSOL RESPIRATORY (INHALATION) TWICE DAILY
Qty: 1 | Refills: 3 | Status: DISCONTINUED | COMMUNITY
Start: 2019-09-18 | End: 2023-10-18

## 2023-10-18 RX ORDER — ALBUTEROL SULFATE 90 UG/1
108 (90 BASE) INHALANT RESPIRATORY (INHALATION) DAILY
Qty: 3 | Refills: 1 | Status: ACTIVE | COMMUNITY
Start: 2020-11-20 | End: 1900-01-01

## 2023-10-18 RX ORDER — BUDESONIDE AND FORMOTEROL FUMARATE DIHYDRATE 160; 4.5 UG/1; UG/1
160-4.5 AEROSOL RESPIRATORY (INHALATION) TWICE DAILY
Qty: 3 | Refills: 1 | Status: DISCONTINUED | COMMUNITY
Start: 2018-05-18 | End: 2023-10-18

## 2023-10-18 RX ORDER — FLUTICASONE PROPIONATE AND SALMETEROL 250; 50 UG/1; UG/1
250-50 POWDER RESPIRATORY (INHALATION)
Qty: 1 | Refills: 5 | Status: ACTIVE | COMMUNITY
Start: 2023-10-18 | End: 1900-01-01

## 2023-10-18 RX ORDER — MOMETASONE FUROATE AND FORMOTEROL FUMARATE DIHYDRATE 100; 5 UG/1; UG/1
100-5 AEROSOL RESPIRATORY (INHALATION) TWICE DAILY
Qty: 1 | Refills: 3 | Status: DISCONTINUED | COMMUNITY
Start: 2020-07-01 | End: 2023-10-18

## 2023-10-18 RX ORDER — MONTELUKAST 10 MG/1
10 TABLET, FILM COATED ORAL
Qty: 30 | Refills: 2 | Status: ACTIVE | COMMUNITY
Start: 2020-07-01 | End: 1900-01-01

## 2023-10-18 RX ORDER — GUAIFENESIN AND PSEUDOEPHEDRINE HYDROCHLORIDE 600; 60 MG/1; MG/1
60-600 TABLET, EXTENDED RELEASE ORAL
Qty: 60 | Refills: 0 | Status: DISCONTINUED | COMMUNITY
Start: 2019-09-18 | End: 2023-10-18

## 2023-10-18 RX ORDER — DOXYCYCLINE HYCLATE 100 MG/1
100 CAPSULE ORAL
Qty: 14 | Refills: 0 | Status: DISCONTINUED | COMMUNITY
Start: 2019-09-20 | End: 2023-10-18

## 2023-10-20 ENCOUNTER — NON-APPOINTMENT (OUTPATIENT)
Age: 53
End: 2023-10-20

## 2023-11-15 ENCOUNTER — APPOINTMENT (OUTPATIENT)
Dept: PULMONOLOGY | Facility: CLINIC | Age: 53
End: 2023-11-15
Payer: MEDICAID

## 2023-11-15 VITALS
DIASTOLIC BLOOD PRESSURE: 84 MMHG | HEART RATE: 116 BPM | BODY MASS INDEX: 33.28 KG/M2 | SYSTOLIC BLOOD PRESSURE: 124 MMHG | TEMPERATURE: 98 F | WEIGHT: 200 LBS | OXYGEN SATURATION: 97 %

## 2023-11-15 PROCEDURE — 94729 DIFFUSING CAPACITY: CPT

## 2023-11-15 PROCEDURE — 94060 EVALUATION OF WHEEZING: CPT

## 2023-11-15 PROCEDURE — 94727 GAS DIL/WSHOT DETER LNG VOL: CPT

## 2023-11-15 PROCEDURE — G0008: CPT

## 2023-11-15 PROCEDURE — 90686 IIV4 VACC NO PRSV 0.5 ML IM: CPT

## 2023-11-15 PROCEDURE — 99215 OFFICE O/P EST HI 40 MIN: CPT | Mod: 25

## 2023-11-15 RX ORDER — SODIUM CHLORIDE 0.65 %
0.65 DROPS NASAL
Qty: 1 | Refills: 0 | Status: ACTIVE | COMMUNITY
Start: 2023-11-15 | End: 1900-01-01

## 2023-11-15 RX ORDER — PREDNISONE 20 MG/1
20 TABLET ORAL
Qty: 9 | Refills: 0 | Status: DISCONTINUED | COMMUNITY
Start: 2023-10-18 | End: 2023-11-15

## 2023-11-15 RX ORDER — FLUTICASONE PROPIONATE 50 UG/1
50 SPRAY, METERED NASAL DAILY
Qty: 1 | Refills: 3 | Status: ACTIVE | COMMUNITY
Start: 2023-11-15 | End: 1900-01-01

## 2023-11-15 RX ORDER — GUAIFENESIN 100 MG/5ML
100 LIQUID ORAL TWICE DAILY
Qty: 1 | Refills: 2 | Status: ACTIVE | COMMUNITY
Start: 2023-11-15 | End: 1900-01-01

## 2023-11-15 RX ORDER — SODIUM CHLORIDE 2.65%
2.65 AEROSOL, SPRAY (ML) NASAL TWICE DAILY
Qty: 1 | Refills: 2 | Status: ACTIVE | COMMUNITY
Start: 2023-11-15 | End: 1900-01-01

## 2023-12-12 ENCOUNTER — APPOINTMENT (OUTPATIENT)
Dept: PULMONOLOGY | Facility: CLINIC | Age: 53
End: 2023-12-12
Payer: MEDICAID

## 2023-12-12 VITALS
SYSTOLIC BLOOD PRESSURE: 128 MMHG | BODY MASS INDEX: 33.12 KG/M2 | HEART RATE: 101 BPM | WEIGHT: 199 LBS | TEMPERATURE: 98.6 F | OXYGEN SATURATION: 99 % | DIASTOLIC BLOOD PRESSURE: 86 MMHG

## 2023-12-12 DIAGNOSIS — R91.8 OTHER NONSPECIFIC ABNORMAL FINDING OF LUNG FIELD: ICD-10-CM

## 2023-12-12 PROCEDURE — 99215 OFFICE O/P EST HI 40 MIN: CPT

## 2024-01-14 ENCOUNTER — NON-APPOINTMENT (OUTPATIENT)
Age: 54
End: 2024-01-14

## 2024-02-20 ENCOUNTER — APPOINTMENT (OUTPATIENT)
Dept: PULMONOLOGY | Facility: CLINIC | Age: 54
End: 2024-02-20
Payer: MEDICAID

## 2024-02-20 VITALS
BODY MASS INDEX: 32.28 KG/M2 | OXYGEN SATURATION: 97 % | HEART RATE: 113 BPM | DIASTOLIC BLOOD PRESSURE: 96 MMHG | WEIGHT: 194 LBS | TEMPERATURE: 97.7 F | SYSTOLIC BLOOD PRESSURE: 136 MMHG

## 2024-02-20 DIAGNOSIS — J45.909 UNSPECIFIED ASTHMA, UNCOMPLICATED: ICD-10-CM

## 2024-02-20 DIAGNOSIS — D86.9 SARCOIDOSIS, UNSPECIFIED: ICD-10-CM

## 2024-02-20 DIAGNOSIS — J45.901 UNSPECIFIED ASTHMA WITH (ACUTE) EXACERBATION: ICD-10-CM

## 2024-02-20 DIAGNOSIS — R05.9 COUGH, UNSPECIFIED: ICD-10-CM

## 2024-02-20 PROCEDURE — 99214 OFFICE O/P EST MOD 30 MIN: CPT

## 2024-02-20 NOTE — PHYSICAL EXAM
[No Acute Distress] : no acute distress [Enlarged Base of the Tongue] : enlarged base of the tongue [IV] : Mallampati Class: IV [Normal Appearance] : normal appearance [No Neck Mass] : no neck mass [Normal Rate/Rhythm] : normal rate/rhythm [Normal S1, S2] : normal s1, s2 [No Murmurs] : no murmurs [No Resp Distress] : no resp distress [Clear to Auscultation Bilaterally] : clear to auscultation bilaterally [Benign] : benign [No HSM] : no hsm [Normal Bowel Sounds] : normal bowel sounds [No Clubbing] : no clubbing [No Edema] : no edema [Normal Color/ Pigmentation] : normal color/ pigmentation [No Focal Deficits] : no focal deficits [Normal Affect] : normal affect [Oriented x3] : oriented x3 [TextBox_2] : elevated BMI [TextBox_44] : thick [TextBox_68] : diminished aeration

## 2024-02-20 NOTE — HISTORY OF PRESENT ILLNESS
[Former] : former [< 20 pack-years] : < 20 pack-years [Never] : never [Snoring] : snoring [Hypersomnolence] : hypersomnolence [TextBox_4] : 53 year old man who initially presented for evaluation of severe cough associated with white phlegm,wheeze, and dyspnea. PFT revealed obstructive pattern.  He was diagnosed with asthma  He also has pulmonary Sarcoidosis diagnosed in 2007 that presented with fatigue dyspnea. It was diagnosed via mediastinoscopy at [facility]. He took prednisone for 4 months and has been in remission.  He also underwent COVID antibody testing that was negative.      He has GERD, taking Zantac rather than PPI.  He is still having symptoms at night.     [TextBox_13] : 1

## 2024-02-20 NOTE — DISCUSSION/SUMMARY
[FreeTextEntry1] : Asthma   sarcoidosis  recent repair of ventral hernia with cough wheeze postoperatively tretaed with course of oral steroid and antibiotic with gradual improivement  Now breathing well on regular use of ICS LABA   reviewed chart from [facility]  Started on ICS LABA regular use, Advair and albuterol, remains improved  albuterol MDI as needed  He underwent a CT chest at [facility] to evaluate his history of sarcoidosis. This demonstrated nodular lesions along the broncho vascular bindles and mediastinal lymphadenopathy. The nodular lesions were also seen on the CT abdomen from 08/2023 obtained while inpatient at [facility]. Prior CT reports from 2012 at [facility] do not report lung nodules  CT chest t [facility] 2/18/24  demonstrated stable nodular lesions up to 1.3 cm, not changed consistent with sarcoidosis history   PLAN  continue ICS LABA with albuterol MDI as needed   follow CT chest as needed  Order overnight sleep study    Health maintenance; PPSV given in past review chart from 2 years ago  Faisal Rene MD

## 2024-04-07 ENCOUNTER — NON-APPOINTMENT (OUTPATIENT)
Age: 54
End: 2024-04-07

## 2024-04-24 ENCOUNTER — NON-APPOINTMENT (OUTPATIENT)
Age: 54
End: 2024-04-24

## 2024-04-25 ENCOUNTER — APPOINTMENT (OUTPATIENT)
Dept: NEUROSURGERY | Facility: CLINIC | Age: 54
End: 2024-04-25
Payer: MEDICAID

## 2024-04-25 ENCOUNTER — NON-APPOINTMENT (OUTPATIENT)
Age: 54
End: 2024-04-25

## 2024-04-25 VITALS
SYSTOLIC BLOOD PRESSURE: 140 MMHG | HEIGHT: 65 IN | DIASTOLIC BLOOD PRESSURE: 96 MMHG | WEIGHT: 194 LBS | HEART RATE: 107 BPM | OXYGEN SATURATION: 96 % | BODY MASS INDEX: 32.32 KG/M2

## 2024-04-25 DIAGNOSIS — M54.16 RADICULOPATHY, LUMBAR REGION: ICD-10-CM

## 2024-04-25 PROCEDURE — 99204 OFFICE O/P NEW MOD 45 MIN: CPT

## 2024-04-25 NOTE — REVIEW OF SYSTEMS
[Leg Weakness] : leg weakness [Numbness] : numbness [Tingling] : tingling [Joint Pain] : joint pain [Limb Pain] : limb pain [Negative] : Integumentary

## 2024-05-08 NOTE — ASSESSMENT
[FreeTextEntry1] : IMPRESSION 54 -year male with lumbar radiculopathy since last year. He had EMG and X rays. EMG is reportedly normal. He is being managed by pain management with trigger point injections and nerve blocks. He has participated in many PT sessions in the past, but nothing recently. Today he presented with back pain which stems from his back and radiated down to left leg until left knee. He sometimes has numbness and tingling in his left toes and lower leg and his symptoms temporarily resolve after the trigger point injections and the nerve block. No recent imaging. Will get Lumbar images and order PT.  PLAN; MRI Lumbar spine w/o contrast  Xray L spine Flex/Ext and AP/LAT PT for back for strengthening and modalities , 2-3 times/week x 8 weeks.  Continue pain management interventions F/U with Dr. Burks after imaging in 1 month

## 2024-05-08 NOTE — HISTORY OF PRESENT ILLNESS
[FreeTextEntry1] : left radiculopathy. [de-identified] : Mr. Maciel is a 54 -year male who presents to the office complaining of low back pain for the past few years. Pain has been present since last year and he denies fall/trauma. The patient has previously seen Neurologist and Orthopedic who have evaluated him with EMG and X rays. EMG is reportedly normal. However, he is being managed by pain management who treats him with trigger point injections and nerve blocks. He has participated in many PT sessions in the past, but nothing recently. He continues to do his walk and home exercises, but no significant improvement noted. The patient has not received any imaging for his back yet.   Today he presented with back pain which stems from his back and radiated down to left leg until left knee. He sometimes has numbness and tingling in his left toes and lower leg. his symptoms temporarily resolve after the trigger point injections and the nerve block. Some days he has to take 5-6 Ibuprofen for managing his pain. He was given muscle relaxant and nerve medication which he tries not taking so much. Pt. denies acute urinary incontinence or retention, no bowel changes or saddle anesthesia. He has tried conservative treatment such as nerve block and trigger point injections, which has not helped.

## 2024-05-08 NOTE — ADDENDUM
[FreeTextEntry1] : Patient completed home therapy from 1/9/24 to 3/6/24 under the guidance of PCP, Dr. Franklin Kirkpatrick from [place], [place]. Patient still suffering with the same pain prior to the therapy. However, he continues to do his stretching at this time.

## 2024-05-08 NOTE — PHYSICAL EXAM
[General Appearance - Alert] : alert [General Appearance - In No Acute Distress] : in no acute distress [General Appearance - Well Nourished] : well nourished [General Appearance - Well-Appearing] : healthy appearing [Oriented To Time, Place, And Person] : oriented to person, place, and time [Affect] : the affect was normal [Memory Recent] : recent memory was not impaired [Person] : oriented to person [Place] : oriented to place [Time] : oriented to time [Short Term Intact] : short term memory intact [Sensation Tactile Decrease] : light touch was intact [Abnormal Walk] : normal gait [Balance] : balance was intact [2+] : Brachioradialis left 2+ [1+] : Ankle jerk left 1+ [Sclera] : the sclera and conjunctiva were normal [PERRL With Normal Accommodation] : pupils were equal in size, round, reactive to light, with normal accommodation [Outer Ear] : the ears and nose were normal in appearance [Both Tympanic Membranes Were Examined] : both tympanic membranes were normal [Neck Appearance] : the appearance of the neck was normal [] : no respiratory distress [Respiration, Rhythm And Depth] : normal respiratory rhythm and effort [Apical Impulse] : the apical impulse was normal [Heart Rate And Rhythm] : heart rate was normal and rhythm regular [No CVA Tenderness] : no ~M costovertebral angle tenderness [No Spinal Tenderness] : no spinal tenderness [Involuntary Movements] : no involuntary movements were seen [Cardenas] : Cardenas's sign was not demonstrated

## 2024-06-01 ENCOUNTER — APPOINTMENT (OUTPATIENT)
Dept: RADIOLOGY | Facility: IMAGING CENTER | Age: 54
End: 2024-06-01

## 2024-06-01 ENCOUNTER — APPOINTMENT (OUTPATIENT)
Dept: MRI IMAGING | Facility: IMAGING CENTER | Age: 54
End: 2024-06-01

## 2024-06-08 ENCOUNTER — APPOINTMENT (OUTPATIENT)
Dept: RADIOLOGY | Facility: IMAGING CENTER | Age: 54
End: 2024-06-08
Payer: MEDICAID

## 2024-06-08 ENCOUNTER — APPOINTMENT (OUTPATIENT)
Dept: MRI IMAGING | Facility: IMAGING CENTER | Age: 54
End: 2024-06-08
Payer: MEDICAID

## 2024-06-08 ENCOUNTER — OUTPATIENT (OUTPATIENT)
Dept: OUTPATIENT SERVICES | Facility: HOSPITAL | Age: 54
LOS: 1 days | End: 2024-06-08
Payer: MEDICAID

## 2024-06-08 DIAGNOSIS — M54.16 RADICULOPATHY, LUMBAR REGION: ICD-10-CM

## 2024-06-08 DIAGNOSIS — S86.012A STRAIN OF LEFT ACHILLES TENDON, INITIAL ENCOUNTER: Chronic | ICD-10-CM

## 2024-06-08 DIAGNOSIS — S43.422A SPRAIN OF LEFT ROTATOR CUFF CAPSULE, INITIAL ENCOUNTER: Chronic | ICD-10-CM

## 2024-06-08 PROCEDURE — 72110 X-RAY EXAM L-2 SPINE 4/>VWS: CPT

## 2024-06-08 PROCEDURE — 72110 X-RAY EXAM L-2 SPINE 4/>VWS: CPT | Mod: 26

## 2024-06-08 PROCEDURE — 72148 MRI LUMBAR SPINE W/O DYE: CPT

## 2024-06-08 PROCEDURE — 72148 MRI LUMBAR SPINE W/O DYE: CPT | Mod: 26

## 2024-06-11 ENCOUNTER — APPOINTMENT (OUTPATIENT)
Dept: PULMONOLOGY | Facility: CLINIC | Age: 54
End: 2024-06-11

## 2024-06-11 NOTE — DISCUSSION/SUMMARY
[FreeTextEntry1] : Asthma   sarcoidosis  recent repair of ventral hernia with cough wheeze postoperatively tretaed with course of oral steroid and antibiotic with gradual improivement  Now breathing well on regular use of ICS LABA   reviewed chart from [facility]  Started on ICS LABA regular use, Advair and albuterol, remains improved  albuterol MDI as needed  He underwent a CT chest at [facility] to evaluate his history of sarcoidosis. This demonstrated nodular lesions along the broncho vascular bindles and mediastinal lymphadenopathy. The nodular lesions were also seen on the CT abdomen from 08/2023 obtained while inpatient at [facility]. Prior CT reports from 2012 at [facility] do not report lung nodules  CT chest t [facility] 2/18/24  demonstrated stable nodular lesions up to 1.3 cm, not changed consistent with sarcoidosis history   PLAN  continue ICS LABA with albuterol MDI as needed   follow CT chest as needed  Order overnight sleep study    Health maintenance; PPSV given in past review chart from 2 years ago  Faisal Rene MD

## 2024-06-11 NOTE — PHYSICAL EXAM
[No Acute Distress] : no acute distress [Enlarged Base of the Tongue] : enlarged base of the tongue [IV] : Mallampati Class: IV [Normal Appearance] : normal appearance [No Neck Mass] : no neck mass [Normal Rate/Rhythm] : normal rate/rhythm [Normal S1, S2] : normal s1, s2 [No Murmurs] : no murmurs [No Resp Distress] : no resp distress [Clear to Auscultation Bilaterally] : clear to auscultation bilaterally [Benign] : benign [No HSM] : no hsm [Normal Bowel Sounds] : normal bowel sounds [No Clubbing] : no clubbing [No Edema] : no edema [Normal Color/ Pigmentation] : normal color/ pigmentation [No Focal Deficits] : no focal deficits [Oriented x3] : oriented x3 [Normal Affect] : normal affect [TextBox_2] : elevated BMI [TextBox_44] : thick [TextBox_68] : diminished aeration

## 2024-06-11 NOTE — HISTORY OF PRESENT ILLNESS
[Former] : former [< 20 pack-years] : < 20 pack-years [Never] : never [Snoring] : snoring [Hypersomnolence] : hypersomnolence [TextBox_4] : 53 year old man who initially presented for evaluation of severe cough associated with white phlegm,wheeze, and dyspnea. PFT revealed obstructive pattern.  He was diagnosed with asthma  He also has pulmonary Sarcoidosis diagnosed in 2007 that presented with fatigue dyspnea. It was diagnosed via mediastinoscopy at [facility]. He took prednisone for 4 months and has been in remission.  He also underwent COVID antibody testing that was negative.      He has GERD, taking Zantac rather than PPI.  He is still having symptoms at night.     [TextBox_13] : 1

## 2024-06-17 ENCOUNTER — APPOINTMENT (OUTPATIENT)
Dept: NEUROSURGERY | Facility: CLINIC | Age: 54
End: 2024-06-17
Payer: MEDICAID

## 2024-06-17 VITALS
HEART RATE: 73 BPM | BODY MASS INDEX: 32.32 KG/M2 | SYSTOLIC BLOOD PRESSURE: 133 MMHG | DIASTOLIC BLOOD PRESSURE: 85 MMHG | WEIGHT: 194 LBS | OXYGEN SATURATION: 95 % | HEIGHT: 65 IN

## 2024-06-17 DIAGNOSIS — M25.552 PAIN IN LEFT HIP: ICD-10-CM

## 2024-06-17 DIAGNOSIS — M54.9 DORSALGIA, UNSPECIFIED: ICD-10-CM

## 2024-06-17 PROCEDURE — 99204 OFFICE O/P NEW MOD 45 MIN: CPT

## 2024-06-19 NOTE — ASSESSMENT
[FreeTextEntry1] : IMPRESSION 54 -year male with lumbar radiculopathy since last year He presented with back pain which stems from his back and radiated down to left leg until left knee with numbness and tingling in his left toes. His symptoms temporarily resolve after the trigger point injections and the nerve block.  He had EMG and X rays. EMG is reportedly normal. He is being managed by pain management with trigger point injections and nerve blocks. He has participated in many PT sessions in the past, but nothing recently, other than home exercises.  MRI L spine with multi-level degenerative changes and STIR hyperintense signal may be related to arthrosis of the left sacroiliac joint. Xray L spine with arthritis at L4-5, but no instability.   PLAN  MRI Pelvis no contrast for evaluation of left SI joint CT Pelvis no contrast -evaluation of left SI joint  MRI Thoracic spine w/o contrast for evaluation of Thoracic fractures Continue pain management interventions as planned F/U in 2 months

## 2024-06-19 NOTE — HISTORY OF PRESENT ILLNESS
[FreeTextEntry1] : Mr. Maciel is a 54 -year male who presents to the office complaining of low back pain for the past few years. Pain has been present since last year and he denies fall/trauma. The patient has previously seen Neurologist and Orthopedic who have evaluated him with EMG and X rays. EMG is reportedly normal. However, he is being managed by pain management who treats him with trigger point injections and nerve blocks. He has participated in many PT sessions in the past, but nothing recently. He continues to do his walk and home exercises, but no significant improvement noted. The patient has not received any imaging for his back yet. Pt presented with back pain and radiculopathy to left leg. He sometimes has numbness and tingling in his left toes and lower leg. his symptoms temporarily resolved after the trigger point injections and the nerve block last done in Feb 2024.  Pt feels well, with no pain, his radiculopathy in his legs is almost resolved.  Pt take pain medication in case of any pain. Pt. denies acute urinary incontinence or retention, no bowel changes or saddle anesthesia. He has tried conservative treatment such as nerve block and trigger point injections, which has helped some time. Pt had the back pain episodes two times, last one was in April 2024, and prior incident was in 2023. Pt worried that he might have the episodes of low back pain again. Pt reveals h/o thoracic spine fracture in the past and had not followed up after that.

## 2024-07-11 ENCOUNTER — NON-APPOINTMENT (OUTPATIENT)
Age: 54
End: 2024-07-11

## 2024-08-09 ENCOUNTER — APPOINTMENT (OUTPATIENT)
Dept: NEUROSURGERY | Facility: CLINIC | Age: 54
End: 2024-08-09

## 2024-08-10 ENCOUNTER — APPOINTMENT (OUTPATIENT)
Dept: CT IMAGING | Facility: IMAGING CENTER | Age: 54
End: 2024-08-10

## 2024-08-31 ENCOUNTER — OUTPATIENT (OUTPATIENT)
Dept: OUTPATIENT SERVICES | Facility: HOSPITAL | Age: 54
LOS: 1 days | End: 2024-08-31
Payer: MEDICAID

## 2024-08-31 ENCOUNTER — APPOINTMENT (OUTPATIENT)
Dept: CT IMAGING | Facility: CLINIC | Age: 54
End: 2024-08-31

## 2024-08-31 ENCOUNTER — APPOINTMENT (OUTPATIENT)
Dept: MRI IMAGING | Facility: CLINIC | Age: 54
End: 2024-08-31

## 2024-08-31 DIAGNOSIS — Z00.8 ENCOUNTER FOR OTHER GENERAL EXAMINATION: ICD-10-CM

## 2024-08-31 DIAGNOSIS — S43.422A SPRAIN OF LEFT ROTATOR CUFF CAPSULE, INITIAL ENCOUNTER: Chronic | ICD-10-CM

## 2024-08-31 DIAGNOSIS — S86.012A STRAIN OF LEFT ACHILLES TENDON, INITIAL ENCOUNTER: Chronic | ICD-10-CM

## 2024-08-31 DIAGNOSIS — M54.9 DORSALGIA, UNSPECIFIED: ICD-10-CM

## 2024-08-31 DIAGNOSIS — M25.552 PAIN IN LEFT HIP: ICD-10-CM

## 2024-08-31 PROCEDURE — 72146 MRI CHEST SPINE W/O DYE: CPT | Mod: 26

## 2024-08-31 PROCEDURE — 72146 MRI CHEST SPINE W/O DYE: CPT

## 2024-08-31 PROCEDURE — 72195 MRI PELVIS W/O DYE: CPT

## 2024-08-31 PROCEDURE — 72195 MRI PELVIS W/O DYE: CPT | Mod: 26

## 2024-08-31 PROCEDURE — 72192 CT PELVIS W/O DYE: CPT

## 2024-08-31 PROCEDURE — 72192 CT PELVIS W/O DYE: CPT | Mod: 26

## 2024-09-09 ENCOUNTER — APPOINTMENT (OUTPATIENT)
Dept: PULMONOLOGY | Facility: CLINIC | Age: 54
End: 2024-09-09
Payer: MEDICAID

## 2024-09-09 VITALS
WEIGHT: 196 LBS | OXYGEN SATURATION: 97 % | BODY MASS INDEX: 32.62 KG/M2 | SYSTOLIC BLOOD PRESSURE: 110 MMHG | TEMPERATURE: 97.7 F | HEART RATE: 79 BPM | DIASTOLIC BLOOD PRESSURE: 78 MMHG

## 2024-09-09 DIAGNOSIS — R91.8 OTHER NONSPECIFIC ABNORMAL FINDING OF LUNG FIELD: ICD-10-CM

## 2024-09-09 DIAGNOSIS — R05.9 COUGH, UNSPECIFIED: ICD-10-CM

## 2024-09-09 DIAGNOSIS — D86.9 SARCOIDOSIS, UNSPECIFIED: ICD-10-CM

## 2024-09-09 DIAGNOSIS — G47.33 OBSTRUCTIVE SLEEP APNEA (ADULT) (PEDIATRIC): ICD-10-CM

## 2024-09-09 DIAGNOSIS — J45.909 UNSPECIFIED ASTHMA, UNCOMPLICATED: ICD-10-CM

## 2024-09-09 PROCEDURE — 99215 OFFICE O/P EST HI 40 MIN: CPT

## 2024-09-09 NOTE — DISCUSSION/SUMMARY
[FreeTextEntry1] : Asthma   sarcoidosis  s/p  repair of ventral hernia with cough wheeze postoperatively treated with course of oral steroid and antibiotic with gradual improvement  Now breathing well   States not using ICS LABA regularly  He underwent a CT chest at [facility] radiology to evaluate his history of sarcoidosis. This demonstrated nodular lesions along the broncho vascular bindles and mediastinal lymphadenopathy. The nodular lesions were also seen on the CT abdomen from 08/2023 obtained while inpatient at [facility]. Prior CT reports from 2012 at [facility] do not report lung nodules  CT chest t [facility] 2/18/24  demonstrated stable nodular lesions up to 2 cm, not changed consistent with sarcoidosis history  home overnight sleep study demonstrated CHAPO 20.1  lowest o2 sat 82%   PLAN  Will order Auto CPAP 4-20 cm H2O e with nasal pillows  continue with albuterol MDI as needed   he will restart  ICS LABA  if respiratory status worsens  needs PFT   Will plan for PFT  when available.  Not available in this office at this time.   follow CT chest 12/2024   Health maintenance; PPSV given in past  Flu vaccine given today per the patient 's request  Total time spent : 40 minutes Including: Preparation prior to visit - Reviewing prior record, results of tests and Consultation Reports as applicable Conducting an appropriate H & P during today's encounter  reviewing all available CT chest exams.  demonstrating images to pt as appropriate Counseling patient  Note completion  med renewal discussing differential diagnosis  assessing obstructive sleep apnea administering vaccine for f;u, reviewing CT chest ordering follow up discussing treatment of obstructive sleep apnea   Faisal Rene MD

## 2024-09-09 NOTE — HISTORY OF PRESENT ILLNESS
[Former] : former [< 20 pack-years] : < 20 pack-years [Never] : never [Snoring] : snoring [Hypersomnolence] : hypersomnolence [TextBox_4] : 53 year old man who initially presented for evaluation of severe cough associated with white phlegm,wheeze, and dyspnea. PFT revealed obstructive pattern.  He was diagnosed with asthma  He also has pulmonary Sarcoidosis diagnosed in 2007 that presented with fatigue dyspnea. It was diagnosed via mediastinoscopy at [facility]. He took prednisone for 4 months and has been in remission.  He also underwent COVID antibody testing that was negative.      He has GERD, taking Zantac rather than PPI.  He is still having symptoms at night.     [TextBox_13] : 1

## 2024-09-09 NOTE — DISCUSSION/SUMMARY
[FreeTextEntry1] : Asthma   sarcoidosis  s/p  repair of ventral hernia with cough wheeze postoperatively treated with course of oral steroid and antibiotic with gradual improvement  Now breathing well   States not using ICS LABA regularly  He underwent a CT chest at [facility] radiology to evaluate his history of sarcoidosis. This demonstrated nodular lesions along the broncho vascular bindles and mediastinal lymphadenopathy. The nodular lesions were also seen on the CT abdomen from 08/2023 obtained while inpatient at [facility]. Prior CT reports from 2012 at [facility] do not report lung nodules  CT chest t [facility] 2/18/24  demonstrated stable nodular lesions up to 2 cm, not changed consistent with sarcoidosis history  home overnight sleep study demonstrated CHAPO 20.1  lowest o2 sat 82%   PLAN  Will order Auto CPAP 4-20 cm H2O e with nasal pillows  continue with albuterol MDI as needed   he will restart  ICS LABA  if respiratory status worsens  needs PFT   Will plan for PFT  when available.  Not available in this office at this time.   follow CT chest 12/2024   Health maintenance; PPSV given in past  Flu vaccine given today per the patient 's request  Total time spent : 40 minutes Including: Preparation prior to visit - Reviewing prior record, results of tests and Consultation Reports as applicable Conducting an appropriate H & P during today's encounter  reviewing all available CT chest exams.  demonstrating images to pt as appropriate Counseling patient  Note completion  med renewal discussing differential diagnosis  assessing obstructive sleep apnea administering vaccine for f;u, reviewing CT chest ordering follow up discussing treatment of obstructive sleep apnea   Faisal Rene MD

## 2024-09-09 NOTE — HISTORY OF PRESENT ILLNESS
[Former] : former [< 20 pack-years] : < 20 pack-years [Never] : never [Snoring] : snoring [Hypersomnolence] : hypersomnolence [TextBox_4] : 53 year old man who initially presented for evaluation of severe cough associated with white phlegm,wheeze, and dyspnea. PFT revealed obstructive pattern.  He was diagnosed with asthma  He also has pulmonary Sarcoidosis diagnosed in 2007 that presented with fatigue dyspnea. It was diagnosed via mediastinoscopy at [facility]. He took prednisone for 4 months and has been in remission.  He also underwent COVID antibody testing that was negative.      He has GERD, taking Zantac rather than PPI.  He is still having symptoms at night.     [TextBox_13] : 1

## 2024-09-11 ENCOUNTER — MED ADMIN CHARGE (OUTPATIENT)
Age: 54
End: 2024-09-11

## 2024-09-23 ENCOUNTER — APPOINTMENT (OUTPATIENT)
Dept: NEUROSURGERY | Facility: CLINIC | Age: 54
End: 2024-09-23
Payer: MEDICAID

## 2024-09-23 VITALS
OXYGEN SATURATION: 98 % | WEIGHT: 196 LBS | BODY MASS INDEX: 32.65 KG/M2 | SYSTOLIC BLOOD PRESSURE: 133 MMHG | HEART RATE: 78 BPM | HEIGHT: 65 IN | DIASTOLIC BLOOD PRESSURE: 93 MMHG

## 2024-09-23 DIAGNOSIS — G89.29 LOW BACK PAIN, UNSPECIFIED: ICD-10-CM

## 2024-09-23 DIAGNOSIS — M46.1 SACROILIITIS, NOT ELSEWHERE CLASSIFIED: ICD-10-CM

## 2024-09-23 DIAGNOSIS — M54.50 LOW BACK PAIN, UNSPECIFIED: ICD-10-CM

## 2024-09-23 PROCEDURE — 99214 OFFICE O/P EST MOD 30 MIN: CPT

## 2024-09-29 PROBLEM — M46.1 SACROILIITIS: Status: ACTIVE | Noted: 2024-09-29

## 2024-09-29 PROBLEM — M54.50 CHRONIC MIDLINE LOW BACK PAIN, UNSPECIFIED WHETHER SCIATICA PRESENT: Status: ACTIVE | Noted: 2024-09-29

## 2024-09-29 NOTE — PHYSICAL EXAM
[General Appearance - Alert] : alert [General Appearance - In No Acute Distress] : in no acute distress [General Appearance - Well Nourished] : well nourished [General Appearance - Well-Appearing] : healthy appearing [Oriented To Time, Place, And Person] : oriented to person, place, and time [Affect] : the affect was normal [Memory Recent] : recent memory was not impaired [Person] : oriented to person [Place] : oriented to place [Time] : oriented to time [Short Term Intact] : short term memory intact [Motor Tone] : muscle tone was normal in all four extremities [Motor Strength] : muscle strength was normal in all four extremities [Sensation Tactile Decrease] : light touch was intact [Abnormal Walk] : normal gait [Balance] : balance was intact [2+] : Brachioradialis left 2+ [Sclera] : the sclera and conjunctiva were normal [PERRL With Normal Accommodation] : pupils were equal in size, round, reactive to light, with normal accommodation [Outer Ear] : the ears and nose were normal in appearance [Both Tympanic Membranes Were Examined] : both tympanic membranes were normal [Neck Appearance] : the appearance of the neck was normal [] : no respiratory distress [Respiration, Rhythm And Depth] : normal respiratory rhythm and effort [Apical Impulse] : the apical impulse was normal [Heart Rate And Rhythm] : heart rate was normal and rhythm regular [No CVA Tenderness] : no ~M costovertebral angle tenderness [No Spinal Tenderness] : no spinal tenderness [Involuntary Movements] : no involuntary movements were seen [3+] : Ankle jerk left 3+ [Cardenas] : Cardenas's sign was not demonstrated

## 2024-09-29 NOTE — HISTORY OF PRESENT ILLNESS
[FreeTextEntry1] : Mr. Maciel is a 54 -year male who presents to the office complaining of low back pain for the past few years. Pain has been present since last year and he denies fall/trauma. The patient has previously seen Neurologist and Orthopedic who have evaluated him with EMG and X rays. EMG is reportedly normal. However, he is being managed by pain management who treats him with trigger point injections and nerve blocks. He has participated in many PT sessions in the past, but nothing recently. He continues to do his walking and home exercises, but no significant improvement noted. The patient has not received any imaging for his back yet. Pt presented with back pain and radiculopathy to left leg. He sometimes has numbness and tingling in his left toes and lower leg. his symptoms temporarily resolved after the trigger point injections and the nerve block last done in Feb 2024.  Pt feels well, with no radiculopathy pain, but feels pain in the back every so often. When pt stand up and bend down pain gets worse. Pt. denies acute urinary incontinence or retention, no bowel changes or saddle anesthesia. He has not tried any pain management measures lately, but in the past nerve block and trigger point injections helped. Pt had the back pain and radiculopathy episodes two times, last one was in April 2024, and prior incident was in 2023. Pt worried that he might have the episodes of low back pain again. Pt completed imaging and here for review.

## 2024-09-29 NOTE — ASSESSMENT
[FreeTextEntry1] : IMPRESSION:  54 -year male with lumbar radiculopathy since last year He presented with back pain which stems from his back and radiated down to left leg until left knee with numbness and tingling in his left toes. His symptoms temporarily resolve after the trigger point injections and the nerve block.  He had EMG and X rays. EMG is reportedly normal. He is being managed by pain management with trigger point injections and nerve blocks. He is doing home exercises.  MRI Hip with mild arthritis, may benefit with SI joint injection if the pain gets worse.  PT sessions in the past, but nothing recently, other than home exercises.  MRI pelvis with subtle edema along the left > right  SI joint at the iliac side. CT of the pelvis also shows some endplate stenosis at the sacroiliac joints. MRI T spine with mild degenerative changes at T12 with compression deformity.  Pt is currently minimally symptomatic  PLAN  If mid back pain get worse will recommend for pain management injection for epidural steroids. Pt has some sacroiliitis Continue pain management interventions as planned F/U as needed

## 2024-10-25 ENCOUNTER — NON-APPOINTMENT (OUTPATIENT)
Age: 54
End: 2024-10-25

## 2024-10-28 ENCOUNTER — APPOINTMENT (OUTPATIENT)
Dept: PULMONOLOGY | Facility: CLINIC | Age: 54
End: 2024-10-28
Payer: MEDICAID

## 2024-10-28 VITALS
DIASTOLIC BLOOD PRESSURE: 86 MMHG | BODY MASS INDEX: 32.28 KG/M2 | WEIGHT: 194 LBS | HEART RATE: 102 BPM | TEMPERATURE: 98.2 F | SYSTOLIC BLOOD PRESSURE: 136 MMHG | OXYGEN SATURATION: 95 %

## 2024-10-28 DIAGNOSIS — J45.901 UNSPECIFIED ASTHMA WITH (ACUTE) EXACERBATION: ICD-10-CM

## 2024-10-28 DIAGNOSIS — G47.33 OBSTRUCTIVE SLEEP APNEA (ADULT) (PEDIATRIC): ICD-10-CM

## 2024-10-28 DIAGNOSIS — J45.909 UNSPECIFIED ASTHMA, UNCOMPLICATED: ICD-10-CM

## 2024-10-28 DIAGNOSIS — J44.9 CHRONIC OBSTRUCTIVE PULMONARY DISEASE, UNSPECIFIED: ICD-10-CM

## 2024-10-28 DIAGNOSIS — R05.9 COUGH, UNSPECIFIED: ICD-10-CM

## 2024-10-28 PROCEDURE — 99214 OFFICE O/P EST MOD 30 MIN: CPT

## 2024-10-28 RX ORDER — AZITHROMYCIN 250 MG/1
250 TABLET, FILM COATED ORAL
Qty: 1 | Refills: 0 | Status: ACTIVE | COMMUNITY
Start: 2024-10-28 | End: 1900-01-01

## 2024-10-28 RX ORDER — ALBUTEROL SULFATE AND BUDESONIDE 90; 80 UG/1; UG/1
90-80 AEROSOL, METERED RESPIRATORY (INHALATION) EVERY 6 HOURS
Qty: 1 | Refills: 3 | Status: ACTIVE | COMMUNITY
Start: 2024-10-28 | End: 1900-01-01

## 2024-10-28 RX ORDER — PREDNISONE 10 MG/1
10 TABLET ORAL DAILY
Qty: 20 | Refills: 0 | Status: ACTIVE | COMMUNITY
Start: 2024-10-28 | End: 1900-01-01

## 2024-11-07 ENCOUNTER — NON-APPOINTMENT (OUTPATIENT)
Age: 54
End: 2024-11-07

## 2024-11-25 ENCOUNTER — APPOINTMENT (OUTPATIENT)
Dept: PULMONOLOGY | Facility: CLINIC | Age: 54
End: 2024-11-25
Payer: MEDICAID

## 2024-11-25 VITALS
WEIGHT: 198 LBS | HEART RATE: 88 BPM | TEMPERATURE: 98.8 F | BODY MASS INDEX: 32.95 KG/M2 | SYSTOLIC BLOOD PRESSURE: 128 MMHG | OXYGEN SATURATION: 97 % | DIASTOLIC BLOOD PRESSURE: 94 MMHG

## 2024-11-25 DIAGNOSIS — D86.9 SARCOIDOSIS, UNSPECIFIED: ICD-10-CM

## 2024-11-25 DIAGNOSIS — J45.909 UNSPECIFIED ASTHMA, UNCOMPLICATED: ICD-10-CM

## 2024-11-25 DIAGNOSIS — G47.33 OBSTRUCTIVE SLEEP APNEA (ADULT) (PEDIATRIC): ICD-10-CM

## 2024-11-25 PROCEDURE — G2211 COMPLEX E/M VISIT ADD ON: CPT | Mod: NC

## 2024-11-25 PROCEDURE — 99214 OFFICE O/P EST MOD 30 MIN: CPT

## 2024-12-31 ENCOUNTER — APPOINTMENT (OUTPATIENT)
Dept: PULMONOLOGY | Facility: CLINIC | Age: 54
End: 2024-12-31
Payer: MEDICAID

## 2024-12-31 VITALS
OXYGEN SATURATION: 98 % | DIASTOLIC BLOOD PRESSURE: 80 MMHG | SYSTOLIC BLOOD PRESSURE: 126 MMHG | HEART RATE: 105 BPM | WEIGHT: 200 LBS | TEMPERATURE: 98.1 F | BODY MASS INDEX: 33.28 KG/M2

## 2024-12-31 DIAGNOSIS — D86.9 SARCOIDOSIS, UNSPECIFIED: ICD-10-CM

## 2024-12-31 DIAGNOSIS — J45.901 UNSPECIFIED ASTHMA WITH (ACUTE) EXACERBATION: ICD-10-CM

## 2024-12-31 DIAGNOSIS — G47.33 OBSTRUCTIVE SLEEP APNEA (ADULT) (PEDIATRIC): ICD-10-CM

## 2024-12-31 DIAGNOSIS — R05.9 COUGH, UNSPECIFIED: ICD-10-CM

## 2024-12-31 DIAGNOSIS — R91.1 SOLITARY PULMONARY NODULE: ICD-10-CM

## 2024-12-31 PROCEDURE — 99214 OFFICE O/P EST MOD 30 MIN: CPT

## 2024-12-31 RX ORDER — DOXYCYCLINE HYCLATE 100 MG/1
100 CAPSULE ORAL
Qty: 14 | Refills: 0 | Status: ACTIVE | COMMUNITY
Start: 2024-12-31 | End: 1900-01-01

## 2025-01-13 ENCOUNTER — APPOINTMENT (OUTPATIENT)
Dept: PULMONOLOGY | Facility: CLINIC | Age: 55
End: 2025-01-13
Payer: MEDICAID

## 2025-01-13 ENCOUNTER — NON-APPOINTMENT (OUTPATIENT)
Age: 55
End: 2025-01-13

## 2025-01-13 VITALS
BODY MASS INDEX: 33.61 KG/M2 | TEMPERATURE: 98.2 F | HEART RATE: 108 BPM | DIASTOLIC BLOOD PRESSURE: 84 MMHG | OXYGEN SATURATION: 96 % | SYSTOLIC BLOOD PRESSURE: 130 MMHG | WEIGHT: 202 LBS

## 2025-01-13 DIAGNOSIS — G47.33 OBSTRUCTIVE SLEEP APNEA (ADULT) (PEDIATRIC): ICD-10-CM

## 2025-01-13 DIAGNOSIS — J20.9 ACUTE BRONCHITIS, UNSPECIFIED: ICD-10-CM

## 2025-01-13 DIAGNOSIS — D86.9 SARCOIDOSIS, UNSPECIFIED: ICD-10-CM

## 2025-01-13 DIAGNOSIS — K21.9 GASTRO-ESOPHAGEAL REFLUX DISEASE W/OUT ESOPHAGITIS: ICD-10-CM

## 2025-01-13 DIAGNOSIS — J45.901 UNSPECIFIED ASTHMA WITH (ACUTE) EXACERBATION: ICD-10-CM

## 2025-01-13 DIAGNOSIS — R05.9 COUGH, UNSPECIFIED: ICD-10-CM

## 2025-01-13 PROCEDURE — 99214 OFFICE O/P EST MOD 30 MIN: CPT

## 2025-01-13 RX ORDER — PROMETHAZINE HYDROCHLORIDE AND DEXTROMETHORPHAN HYDROBROMIDE ORAL SOLUTION 15; 6.25 MG/5ML; MG/5ML
6.25-15 SOLUTION ORAL TWICE DAILY
Qty: 1 | Refills: 2 | Status: ACTIVE | COMMUNITY
Start: 2025-01-13 | End: 1900-01-01

## 2025-01-13 RX ORDER — AZITHROMYCIN 250 MG/1
250 TABLET, FILM COATED ORAL
Qty: 1 | Refills: 0 | Status: ACTIVE | COMMUNITY
Start: 2025-01-13 | End: 1900-01-01

## 2025-01-13 RX ORDER — BENZONATATE 200 MG/1
200 CAPSULE ORAL AT BEDTIME
Qty: 40 | Refills: 0 | Status: ACTIVE | COMMUNITY
Start: 2025-01-13 | End: 1900-01-01

## 2025-01-14 LAB
BASOPHILS # BLD AUTO: 0.15 K/UL
BASOPHILS NFR BLD AUTO: 0.9 %
BORDETELLA PARAPERTUSSIS DNA: NOT DETECTED
BORDETELLA PERTUSSIS DNA: NOT DETECTED
EOSINOPHIL # BLD AUTO: 0 K/UL
EOSINOPHIL NFR BLD AUTO: 0 %
HCT VFR BLD CALC: 48.1 %
HGB BLD-MCNC: 15.6 G/DL
LYMPHOCYTES # BLD AUTO: 1.6 K/UL
LYMPHOCYTES NFR BLD AUTO: 9.6 %
MCHC RBC-ENTMCNC: 27.2 PG
MCHC RBC-ENTMCNC: 32.4 G/DL
MCV RBC AUTO: 83.8 FL
MONOCYTES # BLD AUTO: 1.17 K/UL
MONOCYTES NFR BLD AUTO: 7 %
MSMEAR: NORMAL
NEUTROPHILS # BLD AUTO: 13.45 K/UL
NEUTROPHILS NFR BLD AUTO: 80.7 %
PLAT MORPH BLD: NORMAL
PLATELET # BLD AUTO: 303 K/UL
RAPID RVP RESULT: NOT DETECTED
RBC # BLD: 5.74 M/UL
RBC # FLD: 14.7 %
RBC BLD AUTO: NORMAL
SARS-COV-2 RNA RESP QL NAA+PROBE: NOT DETECTED
VARIANT LYMPHS # BLD MANUAL: 1.8 %
WBC # FLD AUTO: 16.67 K/UL

## 2025-01-15 LAB
M PNEUMO IGM SER QL IA: 0.09 INDEX
MYCOPLASMA AG SPEC QL: NEGATIVE

## 2025-01-17 LAB
M PNEUMO IGG SER IA-ACNC: POSITIVE
M PNEUMO IGG SER QL IA: 1.78 INDEX

## 2025-02-06 ENCOUNTER — APPOINTMENT (OUTPATIENT)
Dept: PULMONOLOGY | Facility: CLINIC | Age: 55
End: 2025-02-06
Payer: MEDICAID

## 2025-02-06 VITALS
DIASTOLIC BLOOD PRESSURE: 96 MMHG | HEART RATE: 107 BPM | TEMPERATURE: 99.6 F | BODY MASS INDEX: 33.66 KG/M2 | SYSTOLIC BLOOD PRESSURE: 146 MMHG | RESPIRATION RATE: 18 BRPM | HEIGHT: 65 IN | WEIGHT: 202 LBS | OXYGEN SATURATION: 96 %

## 2025-02-06 DIAGNOSIS — J20.9 ACUTE BRONCHITIS, UNSPECIFIED: ICD-10-CM

## 2025-02-06 DIAGNOSIS — G47.33 OBSTRUCTIVE SLEEP APNEA (ADULT) (PEDIATRIC): ICD-10-CM

## 2025-02-06 DIAGNOSIS — J45.901 UNSPECIFIED ASTHMA WITH (ACUTE) EXACERBATION: ICD-10-CM

## 2025-02-06 DIAGNOSIS — D86.9 SARCOIDOSIS, UNSPECIFIED: ICD-10-CM

## 2025-02-06 PROCEDURE — G2211 COMPLEX E/M VISIT ADD ON: CPT | Mod: NC

## 2025-02-06 PROCEDURE — 99214 OFFICE O/P EST MOD 30 MIN: CPT

## 2025-02-06 RX ORDER — DOXYCYCLINE HYCLATE 100 MG/1
100 CAPSULE ORAL
Qty: 20 | Refills: 0 | Status: ACTIVE | COMMUNITY
Start: 2025-02-06 | End: 1900-01-01

## 2025-02-06 RX ORDER — METHYLPREDNISOLONE 4 MG/1
4 TABLET ORAL
Qty: 1 | Refills: 2 | Status: ACTIVE | COMMUNITY
Start: 2025-02-06 | End: 1900-01-01

## 2025-02-07 LAB
HMPV RNA NPH QL NAA+NON-PROBE: DETECTED
RAPID RVP RESULT: DETECTED
SARS-COV-2 RNA RESP QL NAA+PROBE: NOT DETECTED

## 2025-02-24 ENCOUNTER — APPOINTMENT (OUTPATIENT)
Dept: PULMONOLOGY | Facility: CLINIC | Age: 55
End: 2025-02-24
Payer: MEDICAID

## 2025-02-24 VITALS
WEIGHT: 208 LBS | HEART RATE: 107 BPM | TEMPERATURE: 98 F | OXYGEN SATURATION: 98 % | DIASTOLIC BLOOD PRESSURE: 84 MMHG | BODY MASS INDEX: 34.61 KG/M2 | SYSTOLIC BLOOD PRESSURE: 140 MMHG

## 2025-02-24 DIAGNOSIS — G47.33 OBSTRUCTIVE SLEEP APNEA (ADULT) (PEDIATRIC): ICD-10-CM

## 2025-02-24 DIAGNOSIS — B34.8 OTHER VIRAL INFECTIONS OF UNSPECIFIED SITE: ICD-10-CM

## 2025-02-24 DIAGNOSIS — J06.9 ACUTE UPPER RESPIRATORY INFECTION, UNSPECIFIED: ICD-10-CM

## 2025-02-24 DIAGNOSIS — J45.901 UNSPECIFIED ASTHMA WITH (ACUTE) EXACERBATION: ICD-10-CM

## 2025-02-24 DIAGNOSIS — D86.9 SARCOIDOSIS, UNSPECIFIED: ICD-10-CM

## 2025-02-24 PROCEDURE — 99214 OFFICE O/P EST MOD 30 MIN: CPT

## 2025-03-25 ENCOUNTER — APPOINTMENT (OUTPATIENT)
Dept: PULMONOLOGY | Facility: CLINIC | Age: 55
End: 2025-03-25

## 2025-04-17 ENCOUNTER — RX CHANGE (OUTPATIENT)
Age: 55
End: 2025-04-17

## 2025-06-23 ENCOUNTER — APPOINTMENT (OUTPATIENT)
Dept: PULMONOLOGY | Facility: CLINIC | Age: 55
End: 2025-06-23

## 2025-07-03 ENCOUNTER — APPOINTMENT (OUTPATIENT)
Dept: DERMATOLOGY | Facility: CLINIC | Age: 55
End: 2025-07-03

## 2025-08-14 ENCOUNTER — APPOINTMENT (OUTPATIENT)
Dept: DERMATOLOGY | Facility: CLINIC | Age: 55
End: 2025-08-14
Payer: MEDICAID

## 2025-08-14 DIAGNOSIS — L80 VITILIGO: ICD-10-CM

## 2025-08-14 DIAGNOSIS — L82.1 OTHER SEBORRHEIC KERATOSIS: ICD-10-CM

## 2025-08-14 PROCEDURE — 99204 OFFICE O/P NEW MOD 45 MIN: CPT

## 2025-08-14 RX ORDER — RUXOLITINIB 15 MG/G
1.5 CREAM TOPICAL
Qty: 1 | Refills: 3 | Status: ACTIVE | COMMUNITY
Start: 2025-08-14 | End: 1900-01-01

## 2025-08-14 RX ORDER — TACROLIMUS 1 MG/G
0.1 OINTMENT TOPICAL
Qty: 1 | Refills: 3 | Status: ACTIVE | COMMUNITY
Start: 2025-08-14 | End: 1900-01-01